# Patient Record
Sex: FEMALE | Race: WHITE | NOT HISPANIC OR LATINO | Employment: FULL TIME
[De-identification: names, ages, dates, MRNs, and addresses within clinical notes are randomized per-mention and may not be internally consistent; named-entity substitution may affect disease eponyms.]

---

## 2023-11-05 ENCOUNTER — HEALTH MAINTENANCE LETTER (OUTPATIENT)
Age: 53
End: 2023-11-05

## 2023-11-13 ENCOUNTER — IMMUNIZATION (OUTPATIENT)
Dept: INTERNAL MEDICINE | Facility: CLINIC | Age: 53
End: 2023-11-13
Payer: COMMERCIAL

## 2023-11-13 DIAGNOSIS — Z23 NEED FOR PROPHYLACTIC VACCINATION AND INOCULATION AGAINST INFLUENZA: Primary | ICD-10-CM

## 2023-11-13 DIAGNOSIS — Z23 NEED FOR COVID-19 VACCINE: ICD-10-CM

## 2023-11-13 PROCEDURE — 90686 IIV4 VACC NO PRSV 0.5 ML IM: CPT

## 2023-11-13 PROCEDURE — 90471 IMMUNIZATION ADMIN: CPT

## 2023-11-13 PROCEDURE — 99207 PR NO CHARGE NURSE ONLY: CPT

## 2023-11-13 PROCEDURE — 91320 SARSCV2 VAC 30MCG TRS-SUC IM: CPT

## 2023-11-13 PROCEDURE — 90480 ADMN SARSCOV2 VAC 1/ONLY CMP: CPT

## 2024-01-05 ENCOUNTER — OFFICE VISIT (OUTPATIENT)
Dept: INTERNAL MEDICINE | Facility: CLINIC | Age: 54
End: 2024-01-05
Payer: COMMERCIAL

## 2024-01-05 VITALS
DIASTOLIC BLOOD PRESSURE: 82 MMHG | WEIGHT: 137.8 LBS | TEMPERATURE: 98.7 F | HEART RATE: 66 BPM | OXYGEN SATURATION: 100 % | SYSTOLIC BLOOD PRESSURE: 134 MMHG | HEIGHT: 67 IN | BODY MASS INDEX: 21.63 KG/M2

## 2024-01-05 DIAGNOSIS — Z11.4 SCREENING FOR HIV (HUMAN IMMUNODEFICIENCY VIRUS): ICD-10-CM

## 2024-01-05 DIAGNOSIS — Z87.898 HISTORY OF LUMP OF RIGHT BREAST: ICD-10-CM

## 2024-01-05 DIAGNOSIS — Z12.4 CERVICAL CANCER SCREENING: ICD-10-CM

## 2024-01-05 DIAGNOSIS — Z86.32 HISTORY OF GESTATIONAL DIABETES: ICD-10-CM

## 2024-01-05 DIAGNOSIS — Z00.00 ROUTINE GENERAL MEDICAL EXAMINATION AT A HEALTH CARE FACILITY: ICD-10-CM

## 2024-01-05 DIAGNOSIS — Z00.00 ENCOUNTER FOR PREVENTATIVE ADULT HEALTH CARE EXAMINATION: ICD-10-CM

## 2024-01-05 DIAGNOSIS — Z12.11 SCREEN FOR COLON CANCER: Primary | ICD-10-CM

## 2024-01-05 DIAGNOSIS — Z11.59 NEED FOR HEPATITIS C SCREENING TEST: ICD-10-CM

## 2024-01-05 DIAGNOSIS — Z13.220 SCREENING FOR LIPID DISORDERS: ICD-10-CM

## 2024-01-05 DIAGNOSIS — Z12.31 VISIT FOR SCREENING MAMMOGRAM: ICD-10-CM

## 2024-01-05 PROBLEM — O24.419 GESTATIONAL DIABETES MELLITUS: Status: ACTIVE | Noted: 2024-01-05

## 2024-01-05 PROBLEM — O24.419 GESTATIONAL DIABETES MELLITUS: Status: RESOLVED | Noted: 2024-01-05 | Resolved: 2024-01-05

## 2024-01-05 PROCEDURE — 99386 PREV VISIT NEW AGE 40-64: CPT | Performed by: INTERNAL MEDICINE

## 2024-01-05 ASSESSMENT — ENCOUNTER SYMPTOMS
HEMATURIA: 0
HEMATOCHEZIA: 0
ABDOMINAL PAIN: 0
COUGH: 0
CHILLS: 0
CONSTIPATION: 0

## 2024-01-05 NOTE — PROGRESS NOTES
SUBJECTIVE:   Sobeida is a 53 year old, presenting for the following:  Physical  Breast ultrasound-referral   Colonoscopy       Healthy Habits:     Getting at least 3 servings of Calcium per day:  Yes    Bi-annual eye exam:  Yes    Dental care twice a year:  Yes    Sleep apnea or symptoms of sleep apnea:  None    Diet:  Regular (no restrictions)    Frequency of exercise:  2-3 days/week    Duration of exercise:  30-45 minutes    Taking medications regularly:  Yes    Medication side effects:  None    Additional concerns today:  No      Today's PHQ-2 Score:       2024     2:08 PM   PHQ-2 (  [employer])   Q1: Little interest or pleasure in doing things 0   Q2: Feeling down, depressed or hopeless 0   PHQ-2 Score 0   Q1: Little interest or pleasure in doing things Not at all   Q2: Feeling down, depressed or hopeless Not at all   PHQ-2 Score 0       Here for a physical    Denies any problems.    Had COVID in September.    History of L5-S1 discectomy long time ago.  Has chronic intermittent back pain, managed with OTC medications.    Had a breast lump on the right side which was biopsied in 2023.  Needs a follow-up ultrasound and MRI.    Pap smear was done in  at [facility], patient pulled up the report on her MyChart.  I am not able to see the report on my Care Everywhere.    She also had a colonoscopy in 2021 with repeat due in 3 years.        Social History     Tobacco Use    Smoking status: Never    Smokeless tobacco: Never   Substance Use Topics    Alcohol use: Not on file             2024     2:08 PM   Alcohol Use   Prescreen: >3 drinks/day or >7 drinks/week? No     Reviewed orders with patient.  Reviewed health maintenance and updated orders accordingly - Yes  Lab work is in process    Breast Cancer Screenin/5/2024     2:08 PM   Breast CA Risk Assessment (FHS-7)   Do you have a family history of breast, colon, or ovarian cancer? No / Unknown         Mammogram  "Screening: Recommended annual mammography  Pertinent mammograms are reviewed under the imaging tab.    History of abnormal Pap smear: NO - age 30-65 PAP every 5 years with negative HPV co-testing recommended     Reviewed and updated as needed this visit by clinical staff   Tobacco  Allergies  Meds              Reviewed and updated as needed this visit by Provider                     Review of Systems   Constitutional:  Negative for chills.   HENT:  Negative for congestion.    Respiratory:  Negative for cough.    Cardiovascular:  Negative for chest pain.   Gastrointestinal:  Negative for abdominal pain, constipation and hematochezia.   Genitourinary:  Negative for hematuria.          OBJECTIVE:   /82   Pulse 66   Temp 98.7  F (37.1  C) (Temporal)   Ht 1.702 m (5' 7\")   Wt 62.5 kg (137 lb 12.8 oz)   SpO2 100%   BMI 21.58 kg/m    Physical Exam  GENERAL: healthy, alert and no distress  EYES: Eyes grossly normal to inspection, PERRL and conjunctivae and sclerae normal  HENT: ear canals and TM's normal, nose and mouth without ulcers or lesions  NECK: no adenopathy, no asymmetry, masses, or scars and thyroid normal to palpation  RESP: lungs clear to auscultation - no rales, rhonchi or wheezes  CV: regular rate and rhythm, normal S1 S2, no S3 or S4, no murmur, click or rub, no peripheral edema and peripheral pulses strong  ABDOMEN: soft, nontender, no hepatosplenomegaly, no masses and bowel sounds normal  MS: no gross musculoskeletal defects noted, no edema  SKIN: no suspicious lesions or rashes  NEURO: Normal strength and tone, mentation intact and speech normal  PSYCH: mentation appears normal, affect normal/bright    Diagnostic Test Results:  Labs reviewed in Epic    ASSESSMENT/PLAN:   Sobeida was seen today for physical.    Diagnoses and all orders for this visit:        Routine general medical examination at a health care facility  Screen for colon cancer  -     Colonoscopy referral placed, to be done in " October of this year    Screening for HIV (human immunodeficiency virus)  -     Ordered    Need for hepatitis C screening test  -     Ordered    Cervical cancer screening  Due for repeat Pap in October 2025    Visit for screening mammogram  History of lump of right breast  Breast ultrasound and MRI ordered    History of gestational diabetes    Encounter for preventative adult health care examination  -    Labs ordered    Screening for lipid disorders        Other orders  -     REVIEW OF HEALTH MAINTENANCE PROTOCOL ORDERS  -     PRIMARY CARE FOLLOW-UP SCHEDULING; Future  -     PRIMARY CARE FOLLOW-UP SCHEDULING; Future        Patient has been advised of split billing requirements and indicates understanding: Yes      COUNSELING:  Reviewed preventive health counseling, as reflected in patient instructions        She reports that she has never smoked. She has never used smokeless tobacco.          Socrates Mendoza MD  [facility]

## 2024-01-05 NOTE — PATIENT INSTRUCTIONS
Please do fasting labs at your convenience.    Schedule Colonoscopy  for October 10/2024.      Preventive Health Recommendations  Female Ages 50 - 64    Yearly exam: See your health care provider every year in order to  Review health changes.   Discuss preventive care.    Review your medicines if your doctor has prescribed any.    Get a Pap test every three years (unless you have an abnormal result and your provider advises testing more often).  If you get Pap tests with HPV test, you only need to test every 5 years, unless you have an abnormal result.   You do not need a Pap test if your uterus was removed (hysterectomy) and you have not had cancer.  You should be tested each year for STDs (sexually transmitted diseases) if you're at risk.   Have a mammogram every 1 to 2 years.  Have a colonoscopy at age 45, or have a yearly FIT test (stool test). These exams screen for colon cancer.    Have a cholesterol test every 5 years, or more often if advised.  Have a diabetes test (fasting glucose) every three years. If you are at risk for diabetes, you should have this test more often.   If you are at risk for osteoporosis (brittle bone disease), think about having a bone density scan (DEXA).    Shots: Get a flu shot each year. Get a tetanus shot every 10 years.    Nutrition:   Eat at least 5 servings of fruits and vegetables each day.  Eat whole-grain bread, whole-wheat pasta and brown rice instead of white grains and rice.  Get adequate Calcium and Vitamin D.     Lifestyle  Exercise at least 150 minutes a week (30 minutes a day, 5 days a week). This will help you control your weight and prevent disease.  Limit alcohol to one drink per day.  No smoking.   Wear sunscreen to prevent skin cancer.   See your dentist every six months for an exam and cleaning.  See your eye doctor every 1 to 2 years.

## 2024-01-15 ENCOUNTER — LAB (OUTPATIENT)
Dept: LAB | Facility: CLINIC | Age: 54
End: 2024-01-15
Payer: COMMERCIAL

## 2024-01-15 DIAGNOSIS — Z11.4 SCREENING FOR HIV (HUMAN IMMUNODEFICIENCY VIRUS): ICD-10-CM

## 2024-01-15 DIAGNOSIS — Z13.220 SCREENING FOR LIPID DISORDERS: ICD-10-CM

## 2024-01-15 DIAGNOSIS — Z00.00 ENCOUNTER FOR PREVENTATIVE ADULT HEALTH CARE EXAMINATION: ICD-10-CM

## 2024-01-15 DIAGNOSIS — Z11.59 NEED FOR HEPATITIS C SCREENING TEST: ICD-10-CM

## 2024-01-15 LAB
ALBUMIN SERPL BCG-MCNC: 4.3 G/DL (ref 3.5–5.2)
ALP SERPL-CCNC: 37 U/L (ref 40–150)
ALT SERPL W P-5'-P-CCNC: 17 U/L (ref 0–50)
ANION GAP SERPL CALCULATED.3IONS-SCNC: 9 MMOL/L (ref 7–15)
AST SERPL W P-5'-P-CCNC: 26 U/L (ref 0–45)
BILIRUB SERPL-MCNC: 0.7 MG/DL
BUN SERPL-MCNC: 13.4 MG/DL (ref 6–20)
CALCIUM SERPL-MCNC: 9.1 MG/DL (ref 8.6–10)
CHLORIDE SERPL-SCNC: 105 MMOL/L (ref 98–107)
CHOLEST SERPL-MCNC: 161 MG/DL
CREAT SERPL-MCNC: 0.98 MG/DL (ref 0.51–0.95)
DEPRECATED HCO3 PLAS-SCNC: 27 MMOL/L (ref 22–29)
EGFRCR SERPLBLD CKD-EPI 2021: 69 ML/MIN/1.73M2
ERYTHROCYTE [DISTWIDTH] IN BLOOD BY AUTOMATED COUNT: 11.7 % (ref 10–15)
FASTING STATUS PATIENT QL REPORTED: YES
GLUCOSE SERPL-MCNC: 86 MG/DL (ref 70–99)
HBA1C MFR BLD: 5.1 % (ref 0–5.6)
HCT VFR BLD AUTO: 40.7 % (ref 35–47)
HCV AB SERPL QL IA: NONREACTIVE
HDLC SERPL-MCNC: 71 MG/DL
HGB BLD-MCNC: 14.1 G/DL (ref 11.7–15.7)
HIV 1+2 AB+HIV1 P24 AG SERPL QL IA: NONREACTIVE
LDLC SERPL CALC-MCNC: 78 MG/DL
MCH RBC QN AUTO: 30.9 PG (ref 26.5–33)
MCHC RBC AUTO-ENTMCNC: 34.6 G/DL (ref 31.5–36.5)
MCV RBC AUTO: 89 FL (ref 78–100)
NONHDLC SERPL-MCNC: 90 MG/DL
PLATELET # BLD AUTO: 211 10E3/UL (ref 150–450)
POTASSIUM SERPL-SCNC: 3.9 MMOL/L (ref 3.4–5.3)
PROT SERPL-MCNC: 7.4 G/DL (ref 6.4–8.3)
RBC # BLD AUTO: 4.57 10E6/UL (ref 3.8–5.2)
SODIUM SERPL-SCNC: 141 MMOL/L (ref 135–145)
TRIGL SERPL-MCNC: 58 MG/DL
TSH SERPL DL<=0.005 MIU/L-ACNC: 1.77 UIU/ML (ref 0.3–4.2)
WBC # BLD AUTO: 5.8 10E3/UL (ref 4–11)

## 2024-01-15 PROCEDURE — 84443 ASSAY THYROID STIM HORMONE: CPT

## 2024-01-15 PROCEDURE — 83036 HEMOGLOBIN GLYCOSYLATED A1C: CPT

## 2024-01-15 PROCEDURE — 36415 COLL VENOUS BLD VENIPUNCTURE: CPT

## 2024-01-15 PROCEDURE — 87389 HIV-1 AG W/HIV-1&-2 AB AG IA: CPT

## 2024-01-15 PROCEDURE — 80061 LIPID PANEL: CPT

## 2024-01-15 PROCEDURE — 80053 COMPREHEN METABOLIC PANEL: CPT

## 2024-01-15 PROCEDURE — 86803 HEPATITIS C AB TEST: CPT

## 2024-01-15 PROCEDURE — 85027 COMPLETE CBC AUTOMATED: CPT

## 2024-01-24 ENCOUNTER — TELEPHONE (OUTPATIENT)
Dept: GASTROENTEROLOGY | Facility: CLINIC | Age: 54
End: 2024-01-24
Payer: COMMERCIAL

## 2024-01-24 NOTE — TELEPHONE ENCOUNTER
"Endoscopy Scheduling Screen    Have you had a positive Covid test in the last 14 days?  No      Are you active on MyChart?   Yes      What insurance is in the chart?  Other:  [employer]    Ordering/Referring Provider: stefano   (If ordering provider performs procedure, schedule with ordering provider unless otherwise instructed. )    BMI: Estimated body mass index is 21.58 kg/m  as calculated from the following:    Height as of 1/5/24: 1.702 m (5' 7\").    Weight as of 1/5/24: 62.5 kg (137 lb 12.8 oz).     Sedation Ordered  moderate sedation.   If patient BMI > 50 do not schedule in [facility].    If patient BMI > 45 do not schedule at [facility].    Are you taking methadone or Suboxone?  No      Are you taking any prescription medications for pain 3 or more times per week?   NO - No RN review required.      Do you have a history of malignant hyperthermia or adverse reaction to anesthesia?  No      (Females) Are you currently pregnant?          Have you been diagnosed or told you have pulmonary hypertension?   No      Do you have an LVAD?  No      Have you been told you have moderate to severe sleep apnea?  No      Have you been told you have COPD, asthma, or any other lung disease?  No      Do you have any heart conditions?  No       Have you ever had an organ transplant?   No      Have you ever had or are you awaiting a heart or lung transplant?   No      Have you had a stroke or transient ischemic attack (TIA aka \"mini stroke\" in the last 6 months?   No      Have you been diagnosed with or been told you have cirrhosis of the liver?   No      Are you currently on dialysis?   No      Do you need assistance transferring?   No    BMI: Estimated body mass index is 21.58 kg/m  as calculated from the following:    Height as of 1/5/24: 1.702 m (5' 7\").    Weight as of 1/5/24: 62.5 kg (137 lb 12.8 oz).     Is patients BMI > 40 and scheduling location [facility]?  No      Do you take an injectable medication for weight loss or diabetes (excluding " insulin)?  No      Do you take the medication Naltrexone?  No      Do you take blood thinners?  No       Prep   Are you currently on dialysis or do you have chronic kidney disease?  No      Do you have a diagnosis of diabetes?  No      Do you have a diagnosis of cystic fibrosis (CF)?  No      On a regular basis do you go 3 -5 days between bowel movements?  No      BMI > 40?  No    Preferred Pharmacy:    [id number] - [place], [place] - [address] AT [facility] [address]  [address]  [facility] [address]  Phone: [phone] Fax: [phone]      Final Scheduling Details   Colonoscopy prep sent?  Standard MiraLAX    Procedure scheduled  Colonoscopy    Surgeon:  NAZANIN     Date of procedure:  5/13/24     Pre-OP / PAC:   No - Not required for this site.    Location  [facility] - Patient preference. [facility]    Sedation   Moderate Sedation - Per order.      Patient Reminders:   You will receive a call from a Nurse to review instructions and health history.  This assessment must be completed prior to your procedure.  Failure to complete the Nurse assessment may result in the procedure being cancelled.      On the day of your procedure, please designate an adult(s) who can drive you home stay with you for the next 24 hours. The medicines used in the exam will make you sleepy. You will not be able to drive.      You cannot take public transportation, ride share services, or non-medical taxi service without a responsible caregiver.  Medical transport services are allowed with the requirement that a responsible caregiver will receive you at your destination.  We require that drivers and caregivers are confirmed prior to your procedure.

## 2024-01-26 ENCOUNTER — MYC MEDICAL ADVICE (OUTPATIENT)
Dept: INTERNAL MEDICINE | Facility: CLINIC | Age: 54
End: 2024-01-26
Payer: COMMERCIAL

## 2024-02-01 ENCOUNTER — ANCILLARY PROCEDURE (OUTPATIENT)
Dept: MRI IMAGING | Facility: CLINIC | Age: 54
End: 2024-02-01
Attending: INTERNAL MEDICINE
Payer: COMMERCIAL

## 2024-02-01 ENCOUNTER — ANCILLARY PROCEDURE (OUTPATIENT)
Dept: ULTRASOUND IMAGING | Facility: CLINIC | Age: 54
End: 2024-02-01
Attending: INTERNAL MEDICINE
Payer: COMMERCIAL

## 2024-02-01 DIAGNOSIS — Z87.898 HISTORY OF LUMP OF RIGHT BREAST: ICD-10-CM

## 2024-02-01 PROCEDURE — A9585 GADOBUTROL INJECTION: HCPCS

## 2024-02-01 PROCEDURE — 77049 MRI BREAST C-+ W/CAD BI: CPT | Mod: GC

## 2024-02-01 PROCEDURE — 76642 ULTRASOUND BREAST LIMITED: CPT | Mod: RT

## 2024-02-01 RX ORDER — GADOBUTROL 604.72 MG/ML
7.5 INJECTION INTRAVENOUS ONCE
Status: COMPLETED | OUTPATIENT
Start: 2024-02-01 | End: 2024-02-01

## 2024-02-01 RX ADMIN — GADOBUTROL 6.5 ML: 604.72 INJECTION INTRAVENOUS at 08:51

## 2024-02-09 ENCOUNTER — MYC MEDICAL ADVICE (OUTPATIENT)
Dept: INTERNAL MEDICINE | Facility: CLINIC | Age: 54
End: 2024-02-09
Payer: COMMERCIAL

## 2024-02-09 DIAGNOSIS — Z87.898 HISTORY OF LUMP OF RIGHT BREAST: Primary | ICD-10-CM

## 2024-02-11 ENCOUNTER — PATIENT OUTREACH (OUTPATIENT)
Dept: ONCOLOGY | Facility: CLINIC | Age: 54
End: 2024-02-11
Payer: COMMERCIAL

## 2024-02-11 NOTE — PROGRESS NOTES
New Patient Oncology Nurse Navigator Note     Referring provider: Socrates Mendoza MD     Referring Clinic/Organization: [facility]     Referred to (specialty:) Breast Provider Referral     Date Referral Received: February 9, 2024     Evaluation for:  Z87.898 (ICD-10-CM) - History of lump of right breast     Clinical History (per Nurse review of records provided):      She had a left breast lump that was ultimately found to be a cyst. However, when she was having a work up with mammogram and ultrasound for the left breast lump, they noticed a concern on the right breast. 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 Negative for atypia and malignancy. Clinical correlation with imaging results is recommended.     6/22/23 - [facility] Accession Number: [id number]  Final Diagnosis:   Breast, right mass, ultrasound-guided needle core biopsy - Consistent with papilloma with   sclerosis, apocrine metaplasia and usual ductal hyperplasia (UDH). Negative for atypia and   malignancy.  Clinical correlation with imaging results is recommended.     Her most recent visit with Dr. Santiago Holden was on 7/12/2023. Discussed treatment options for papilloma including leaving it alone or removing papilloma along with imaging that would be associated with each option. Patient was not interested in removing papilloma at that time. Dr. Holden suggested screening with:  - MRI breast and ultrasound in 6 months   - Mammogram in 1 year  - Return to clinic in 6 months, sooner if she notices changes     Family history of aunt with breast and ovarian cancer.  She recalls having genetic counseling 15 years ago although Dr. Holden's note in 2023 states 10 years ago. She did have genetic testing ([employer]) and was negative for BRCA 1 or 2  mutations.     Patient saw Dr. Mendoza on 1/5/24. Provider placed imaging orders    2/1/24 - Bilateral breast MRI  FINDINGS:   In the right breast at approximately 10:00-9:00 position, posterior depth there is a 1.2 x 0.8 cm enhancing mass, presumably biopsy proven papilloma (axial image 79). No other suspicious enhancement in the breasts. Bilateral T2 hyperintense cysts in the breasts. No lymphadenopathy.  Pectus deformity of the chest. Partially visualized T2 hyperintense cyst in the liver.   IMPRESSION: BI-RADS CATEGORY: 2 - Benign.  RECOMMENDED FOLLOW-UP:   1. Continued surgical/clinical consultation for biopsy-proven right-sided papilloma. Examination can be compared with outside priors become available.  2. Continued age and risk based screening.    Prior imaging subsequently were received and radiologist reviewed with following addendum for 2/1/24 breast MRI:  Addendum: MRI was compared with outside prior mammogram and ultrasound  examinations from 6/7/2023 and 6/22/2023.  The initial impression and recommendations stand.    2/1/24 - Right breast ultrasound   Findings:     Targeted right breast ultrasound evaluation was performed by the technologist and radiologist. At the 9:00 position, 4 cm from the nipple, there is a 0.7 x 0.6 x 1.4 cm hypoechoic intraductal mass with a biopsy clip (previously 1.5 x 0.6 x 0.6 cm on 6/7/2023 on report from Care Everywhere, however images not available for direct comparison).  IMPRESSION: BI-RADS CATEGORY: 0 - Incomplete - Need Additional Imaging  Evaluation and/or Prior Mammograms for Comparison.  RECOMMENDED FOLLOW-UP: Obtain Prior Exam for Comparison.    NN PLEASE SEE [employer]BoxT MESSAGES BETWEEN PATIENT AND ORDERING PROVIDER ON 2/9/24:    2/12 11:09 - Telephoned and spoke with Amparo to assure her we are aware of the referral from her provider and are pulling records relevant to the referral and will be back in touch as soon as possible with an update. She expressed  appreciation for the call.     2/14 9:56 - Telephoned and spoke with Amparo informing her Zera notes received as well as prior imaging. I'll be back in touch when plan is finalized.     2/16 14:17 - Telephoned and left voice message for Amparo inviting call to New Patient Scheduling to finalize appointment with Marissa Gordillo PA-C for high-risk surveillance. The pectus deformity of the chest noted on breast MRI is likely something she was born with and presents no concerns. Encouraged her to also discuss that with Marissa for any further questions. Welcomed calls. Writer received referral, reviewed for appropriate plan, and referral transferred to New Patient Scheduling for completion.    2/19 11:23 - Amparo is now scheduled to see Marissa Gordillo PA-C on 3/21. Amparo asked  for clarification and writer call back to assist. We review the radiologist's comments after prior imaging was compared with recent MRI and ultrasound. We also reviewed what to expect at consult with Marissa Gordillo and her role in our breast and cancer risk management program. Patient verbalized satisfaction with these answers.    Records Location: See Bookmarked material     Records Needed: Breast imaging for past 5 years  [facility]era consult and progress notes  Genetic counseling consult and progress notes from approximately 2008 or 2013 (15 years ago or 10 years ago--needs clarification )  Genetic testing report from approximately 2008 or 2013

## 2024-02-12 ENCOUNTER — PRE VISIT (OUTPATIENT)
Dept: ONCOLOGY | Facility: CLINIC | Age: 54
End: 2024-02-12
Payer: COMMERCIAL

## 2024-02-12 NOTE — TELEPHONE ENCOUNTER
Imaging Received  February 13, 2024 8:16 AM ABT   Action: Breast Images from [facility] received and email sent to  Imaging team to resolve breast images to PACS.     Action February 12, 2024 11:41 AM ABT   Action Taken IB from SHALA Soriano - pt gave verbal auth to update [facility]     Records needed:  Breast imaging for past 5 years -- 2023 critical to move forward  Please collect authorization for [facility] notes needed    RECORDS STATUS - BREAST    RECORDS REQUESTED FROM: [facility]   DATE REQUESTED:    NOTES DETAILS STATUS   OFFICE NOTE from referring provider Epic 01/05/24: Dr. Socrates Mendoza   OFFICE NOTE from medical oncologist [facility] 07/12/23: Dr. Santiago Holden   MEDICATION LIST [facility]    LABS     PATHOLOGY REPORTS  (Tissue diagnosis, Stage, ER/AZ percentage positive and intensity of staining, HER2 IHC, FISH, and all biopsies from breast and any distant metastasis)                 Report in [facility] 06/22/23: [id number]   IMAGING (NEED IMAGES & REPORT)     MAMMO PACS [facility]:  06/22/23-07/27/20   ULTRASOUND PACS [facility]:  06/22/23-07/31/20: US Breast

## 2024-03-19 NOTE — PROGRESS NOTES
NEW CONSULTATION   Mar 21, 2024     Sobeida Diaz is a 53 year old woman who presents with a family history of breast cancer. She was referred by Dr. Mendoza.    HPI:    History of palpable left breast mass in 2023 that demonstrated a benign appearing cyst on work up. Work up demonstrated a right breast nodule at 9:00. Biopsy 6/22/23 demonstrated a papilloma. She had a breast MRI and right breast ultrasound 2/1/24 that demonstrated a right breast enhancing mass, stable intraductal mass. She did meet with Dr. Holden who recommended high risk screening.     Family history of aunt with breast and family history of ovarian cancer. Amparo had negative genetic testing prior to 2014.     Today she denies any breast mass, skin change, nipple inversion or nipple discharge.     BREAST-SPECIFIC HISTORY:    Previous breast imaging: Yes  - 11/21/12 Smammo BI-RADS 1  - 12/31/13 Smammo BI-RADS 1  - 6/3/15 Smammo BI-RADS 1  - 5/24/16 b/l Dmammo and left breast ultrasound for lump: benign cysts 7:00, 2:00 and 10:30 BI-RADS 2  - 6/5/17 Smammo BI-RADS 1  - 12/14/18 Smammo BI-RADS 1  - 7/27/20 Smammo left architectural distortion  - 7/31/20 left Dmammo and left breast ultrasound: BI-RADS 2  - 10/22/21  - 6/7/23 b/l Dmammo b/lbreast 9:00 6 mm lobulated density on mammo, right breast ultrasound 9:00 4 cm FN 1.5 cm nodule, left breast ultrasound 9:00 7 cm FN cluster of small cysts and 2:00 2.8 cm simples cyst. BI-RADS 4 biopsy recommend of right breast nodule.   - 6/22/23 right breast ultrasound guided biopsy of right breast nodule 9:00: papilloma   - 2/1/24 breast MRI and right breast ultrasound; MRI demonstrated 9:00 right breast 1.2 cm enhancing mass, ultrasound demonstrated 9:00 4 cm FN 1.4 cm intraductal mass, stable BI-RADS 2    Prior breast biopsies/surgeries: Yes  - 6/22/23 right breast ultrasound guided biopsy of right breast nodule 9:00: papilloma     Prior history of breast cancer or DCIS: No  Prior radiation  "history: No   Self breast exams: No  Breast density: extremely     GYN HISTORY:  Age at 1st pregnancy: 34. Breastfeeding history: No.   Age at menarche: 14  Menopausal: premenopausal  Menopausal hormone replacement therapy: No     RISK ASSESSMENT: < 3% 5 year risk by Tiff, > 20% lifetime risk by NO  Tiff:1.6% 5 year risk   NO/Claudia: 24.7% lifetime risk    FAMILY HISTORY:  Breast ca: Yes  - maternal aunt past of breast cancer in 70's (2 total aunts)  Ovarian ca: Yes  - mother's aunts ovarian cancer  - paternal mother sisters  Pancreatic ca: No  Prostate: No  Gastric ca: No  Melanoma: No  Colon ca: No  Other cancer: yes  - mother with lung cancer  Other genetic, testing, syndromes, or clotting disorders: no     PAST MEDICAL HISTORY  Past Medical History:   Diagnosis Date    Gestational diabetes mellitus      PAST SURGICAL HISTORY   Past Surgical History:   Procedure Laterality Date    BACK SURGERY  1994    L5 S1 herniated disc, had discectomy    CHOLECYSTECTOMY  1998     MEDICATIONS  No current outpatient medications on file.     ALLERGIES   No Known Allergies     SOCIAL HISTORY:  Smokes: No  EtOH: Yes, minimally   Exercise: active   Works for [employer]    ROS:  Change in vision No  Headaches: no  Respiratory: No shortness of breath, dyspnea on exertion, cough, or hemoptysis   Cardiovascular: negative   Gastrointestinal: negative Abdominal pain: no  Breast: negative  Musculoskeletal: negative Joint pain No Back pain: no  Psychiatric: negative  Hematologic/Lymphatic/Immunologic: negative  Endocrine: negative    EXAM  BP (!) 143/90   Pulse 63   Temp 97.8  F (36.6  C) (Oral)   Resp 20   Ht 1.737 m (5' 8.39\")   Wt 65.3 kg (144 lb)   SpO2 99%   BMI 21.65 kg/m     PHYSICAL EXAM  Respiratory: breathing non labored.   Breasts: Examination was done in both the upright and supine positions.  Breasts are symmetrical . No dominant fixed or suspicious masses noted. No skin or nipple changes. No nipple " discharge.   No clavicular, cervical, or axillary lymphadenopathy.       ASSESSMENT/PLAN:    Sobeida Diaz is a 53 year old woman personal history of right breast papilloma and family history of breast cancer. She meets NCCN guidelines for high risk screening.     1) Family history of breast cancer  Discussed she meets NCCN guidelines for high risk screening based on family history with lifetime risk for breast cancer of >20%. Screening recommendations based on NCCN guidelines. Be familiar with your breast and promptly report any changes to your health care provider. Clinical encounter every 6-12 months. Annual mammogram with carol alternating with annual breast MRI.    - Screening mammogram with clinic visit due: 8/2024  - Breast MRI with clinic visit due: 2/2/25  - Follow with genetic counseling.      2) Counseling was provided with available strategies including lifestyle modifications and risk reducing intervention.   - Maintain your best healthy weight. Higher body fat and adult weight gain is associated with increased risk for breast cancer. This increase in risk has been attributed to increase in circulating endogenous estrogen levels from fat tissue.   - Any alcohol intake increases the risk for breast cancer. If you choose to drink alcohol limit alcohol consumption to less than 1 drink (1 ounce of liquor, 6 ounces of wine, or 8 ounces of beer) per day or less than 3 drinks per week.  - Be active daily and void being sedentary.   - Vitamin D may decrease the risk of developing breast cancer.     Marissa Gordillo PA-C    30 minutes spent on the date of the encounter doing chart review, review of test results, interpretation of tests, patient visit and documentation.

## 2024-03-21 ENCOUNTER — PATIENT OUTREACH (OUTPATIENT)
Dept: ONCOLOGY | Facility: CLINIC | Age: 54
End: 2024-03-21

## 2024-03-21 ENCOUNTER — ONCOLOGY VISIT (OUTPATIENT)
Dept: SURGERY | Facility: CLINIC | Age: 54
End: 2024-03-21
Attending: INTERNAL MEDICINE
Payer: COMMERCIAL

## 2024-03-21 VITALS
DIASTOLIC BLOOD PRESSURE: 90 MMHG | BODY MASS INDEX: 21.82 KG/M2 | OXYGEN SATURATION: 99 % | WEIGHT: 144 LBS | SYSTOLIC BLOOD PRESSURE: 143 MMHG | RESPIRATION RATE: 20 BRPM | HEART RATE: 63 BPM | TEMPERATURE: 97.8 F | HEIGHT: 68 IN

## 2024-03-21 DIAGNOSIS — Z87.898 HISTORY OF LUMP OF RIGHT BREAST: ICD-10-CM

## 2024-03-21 DIAGNOSIS — Z80.3 FAMILY HISTORY OF BREAST CANCER: Primary | ICD-10-CM

## 2024-03-21 DIAGNOSIS — Z80.41 FAMILY HISTORY OF OVARIAN CANCER: ICD-10-CM

## 2024-03-21 PROCEDURE — 99213 OFFICE O/P EST LOW 20 MIN: CPT | Performed by: PHYSICIAN ASSISTANT

## 2024-03-21 ASSESSMENT — PAIN SCALES - GENERAL: PAINLEVEL: NO PAIN (0)

## 2024-03-21 NOTE — NURSING NOTE
"Oncology Rooming Note    March 21, 2024 7:53 AM   Sobeida Diaz is a 53 year old female who presents for:    Chief Complaint   Patient presents with    Oncology Clinic Visit     Hx of right breast lump     Initial Vitals: BP (!) 143/90   Pulse 63   Temp 97.8  F (36.6  C) (Oral)   Resp 20   Ht 1.737 m (5' 8.39\")   Wt 65.3 kg (144 lb)   SpO2 99%   BMI 21.65 kg/m   Estimated body mass index is 21.65 kg/m  as calculated from the following:    Height as of this encounter: 1.737 m (5' 8.39\").    Weight as of this encounter: 65.3 kg (144 lb). Body surface area is 1.78 meters squared.  No Pain (0) Comment: Data Unavailable   No LMP recorded. Patient is perimenopausal.  Allergies reviewed: Yes  Medications reviewed: Yes    Medications: Medication refills not needed today.  Pharmacy name entered into [employer]: [id number] - [facility] [address] AT [facility] OF [facility] [address]    Frailty Screening:   Is the patient here for a new oncology consult visit in cancer care? 2. No      Clinical concerns: none       Barbara Rosales CMA            "

## 2024-03-21 NOTE — PROGRESS NOTES
Writer received referral to Cancer Risk Management/Genetic Counseling.    Referred for:     family history of breast & ovarian cancer   Per referring note of Marissa: Family history of aunt with breast and family history of ovarian cancer. Amparo had negative genetic testing prior to 2014.     Follow with genetic counseling.    Referral reviewed for appropriate plan, and sent to New Patient Scheduling ([phone]) for completion.    Fide Beltre RN, BSN  Oncology New Patient Nurse Navigator   [facility] [place]  [phone]

## 2024-03-21 NOTE — LETTER
3/21/2024         RE: Sobeida Diaz  [address] [facility] [address]        Dear Colleague,    Thank you for referring your patient, Sobeida Diaz, to the [facility]. Please see a copy of my visit note below.    NEW CONSULTATION   Mar 21, 2024     Sobeida Diaz is a 53 year old woman who presents with a family history of breast cancer. She was referred by Dr. Mendoza.    HPI:    History of palpable left breast mass in 2023 that demonstrated a benign appearing cyst on work up. Work up demonstrated a right breast nodule at 9:00. Biopsy 6/22/23 demonstrated a papilloma. She had a breast MRI and right breast ultrasound 2/1/24 that demonstrated a right breast enhancing mass, stable intraductal mass. She did meet with Dr. Holden who recommended high risk screening.     Family history of aunt with breast and family history of ovarian cancer. Amparo had negative genetic testing prior to 2014.     Today she denies any breast mass, skin change, nipple inversion or nipple discharge.     BREAST-SPECIFIC HISTORY:    Previous breast imaging: Yes  - 11/21/12 Smammo BI-RADS 1  - 12/31/13 Smammo BI-RADS 1  - 6/3/15 Smammo BI-RADS 1  - 5/24/16 b/l Dmammo and left breast ultrasound for lump: benign cysts 7:00, 2:00 and 10:30 BI-RADS 2  - 6/5/17 Smammo BI-RADS 1  - 12/14/18 Smammo BI-RADS 1  - 7/27/20 Smammo left architectural distortion  - 7/31/20 left Dmammo and left breast ultrasound: BI-RADS 2  - 10/22/21  - 6/7/23 b/l Dmammo b/lbreast 9:00 6 mm lobulated density on mammo, right breast ultrasound 9:00 4 cm FN 1.5 cm nodule, left breast ultrasound 9:00 7 cm FN cluster of small cysts and 2:00 2.8 cm simples cyst. BI-RADS 4 biopsy recommend of right breast nodule.   - 6/22/23 right breast ultrasound guided biopsy of right breast nodule 9:00: papilloma   - 2/1/24 breast MRI and right breast ultrasound; MRI demonstrated 9:00 right breast 1.2 cm enhancing  "mass, ultrasound demonstrated 9:00 4 cm FN 1.4 cm intraductal mass, stable BI-RADS 2    Prior breast biopsies/surgeries: Yes  - 6/22/23 right breast ultrasound guided biopsy of right breast nodule 9:00: papilloma     Prior history of breast cancer or DCIS: No  Prior radiation history: No   Self breast exams: No  Breast density: extremely     GYN HISTORY:  Age at 1st pregnancy: 34. Breastfeeding history: No.   Age at menarche: 14  Menopausal: premenopausal  Menopausal hormone replacement therapy: No     RISK ASSESSMENT: < 3% 5 year risk by Tiff, > 20% lifetime risk by NO  Tiff:1.6% 5 year risk   NO/Claudia: 24.7% lifetime risk    FAMILY HISTORY:  Breast ca: Yes  - maternal aunt past of breast cancer in 70's (2 total aunts)  Ovarian ca: Yes  - mother's aunts ovarian cancer  - paternal mother sisters  Pancreatic ca: No  Prostate: No  Gastric ca: No  Melanoma: No  Colon ca: No  Other cancer: yes  - mother with lung cancer  Other genetic, testing, syndromes, or clotting disorders: no     PAST MEDICAL HISTORY  Past Medical History:   Diagnosis Date    Gestational diabetes mellitus      PAST SURGICAL HISTORY   Past Surgical History:   Procedure Laterality Date    BACK SURGERY  1994    L5 S1 herniated disc, had discectomy    CHOLECYSTECTOMY  1998     MEDICATIONS  No current outpatient medications on file.     ALLERGIES   No Known Allergies     SOCIAL HISTORY:  Smokes: No  EtOH: Yes, minimally   Exercise: active   Works for [employer]    ROS:  Change in vision No  Headaches: no  Respiratory: No shortness of breath, dyspnea on exertion, cough, or hemoptysis   Cardiovascular: negative   Gastrointestinal: negative Abdominal pain: no  Breast: negative  Musculoskeletal: negative Joint pain No Back pain: no  Psychiatric: negative  Hematologic/Lymphatic/Immunologic: negative  Endocrine: negative    EXAM  BP (!) 143/90   Pulse 63   Temp 97.8  F (36.6  C) (Oral)   Resp 20   Ht 1.737 m (5' 8.39\")   Wt 65.3 kg (144 lb)  "  SpO2 99%   BMI 21.65 kg/m     PHYSICAL EXAM  Respiratory: breathing non labored.   Breasts: Examination was done in both the upright and supine positions.  Breasts are symmetrical . No dominant fixed or suspicious masses noted. No skin or nipple changes. No nipple discharge.   No clavicular, cervical, or axillary lymphadenopathy.       ASSESSMENT/PLAN:    Sobeida Diaz is a 53 year old woman personal history of right breast papilloma and family history of breast cancer. She meets NCCN guidelines for high risk screening.     1) Family history of breast cancer  Discussed she meets NCCN guidelines for high risk screening based on family history with lifetime risk for breast cancer of >20%. Screening recommendations based on NCCN guidelines. Be familiar with your breast and promptly report any changes to your health care provider. Clinical encounter every 6-12 months. Annual mammogram with carol alternating with annual breast MRI.    - Screening mammogram with clinic visit due: 8/2024  - Breast MRI with clinic visit due: 2/2/25  - Follow with genetic counseling.      2) Counseling was provided with available strategies including lifestyle modifications and risk reducing intervention.   - Maintain your best healthy weight. Higher body fat and adult weight gain is associated with increased risk for breast cancer. This increase in risk has been attributed to increase in circulating endogenous estrogen levels from fat tissue.   - Any alcohol intake increases the risk for breast cancer. If you choose to drink alcohol limit alcohol consumption to less than 1 drink (1 ounce of liquor, 6 ounces of wine, or 8 ounces of beer) per day or less than 3 drinks per week.  - Be active daily and void being sedentary.   - Vitamin D may decrease the risk of developing breast cancer.     Marissa Gordillo PA-C    30 minutes spent on the date of the encounter doing chart review, review of test results, interpretation of tests,  patient visit and documentation.

## 2024-03-21 NOTE — PATIENT INSTRUCTIONS
Sobeida Diaz is a 53 year old woman personal history of right breast papilloma and family history of breast cancer. She meets NCCN guidelines for high risk screening.     1) Family history of breast cancer  Discussed she meets NCCN guidelines for high risk screening based on family history with lifetime risk for breast cancer of >20%. Screening recommendations based on NCCN guidelines. Be familiar with your breast and promptly report any changes to your health care provider. Clinical encounter every 6-12 months. Annual mammogram with carol alternating with annual breast MRI.    - Screening mammogram with clinic visit due: 8/2024  - Breast MRI with clinic visit due: 2/2/25  - Follow with genetic counseling.      2) Counseling was provided with available strategies including lifestyle modifications and risk reducing intervention.   - Maintain your best healthy weight. Higher body fat and adult weight gain is associated with increased risk for breast cancer. This increase in risk has been attributed to increase in circulating endogenous estrogen levels from fat tissue.   - Any alcohol intake increases the risk for breast cancer. If you choose to drink alcohol limit alcohol consumption to less than 1 drink (1 ounce of liquor, 6 ounces of wine, or 8 ounces of beer) per day or less than 3 drinks per week.  - Be active daily and void being sedentary.   - Vitamin D may decrease the risk of developing breast cancer.

## 2024-04-29 DIAGNOSIS — Z80.3 FAMILY HISTORY OF BREAST CANCER: Primary | ICD-10-CM

## 2024-05-01 ENCOUNTER — TELEPHONE (OUTPATIENT)
Dept: GASTROENTEROLOGY | Facility: CLINIC | Age: 54
End: 2024-05-01
Payer: COMMERCIAL

## 2024-05-01 NOTE — TELEPHONE ENCOUNTER
"Per 10/6/21 colonoscopy report with [facility] ([facility]): \"Repeat colonoscopy in 5 years for surveillance\".  Seeking clarification from PCP/referring provider if a screening colonoscopy is indicated at this time.   Proceed with colonoscopy procedure as scheduled on 5/13/24 per referring provider, Dr. Mendoza: \"I thought the report said repeat 3 years and that is what the patient told me.\" and \"Yes  , proceed with it as scheduled.\"    Patient could check with insurance to confirm coverage of procedure.   ---------------------------------------------------------    Pre visit planning completed.      Procedure details:    Patient scheduled for Colonoscopy  on 5/13/24.     Arrival time: 0715. Procedure time 0800    Facility location: [facility]; [address] [place] [id number]. Check in location: [address] [place] [address].     Sedation type: Conscious sedation     Pre op exam needed? N/A    Indication for procedure: Screening      Chart review:     Electronic implanted devices? No    Recent diagnosis of diverticulitis within the last 6 weeks? No    Diabetic? No      Medication review:    Anticoagulants? No    NSAIDS? No NSAID medications per patient's medication list.  RN will verify with pre-assessment call.    Other medication HOLDING recommendations:  N/A      Prep for procedure:     Bowel prep recommendation: Standard Miralax  Due to: standard bowel prep.    Prep instructions sent via [employer]    Addendum in red. Anette Wei RN on 5/2/2024 at 4:09 PM    Anette Wei RN  Endoscopy Procedure Pre Assessment RN  [phone] option 4      "

## 2024-05-03 NOTE — TELEPHONE ENCOUNTER
Attempted to contact patient in order to complete pre assessment questions.     No answer. Left message to return call to [phone] option 4    Callback required communication sent via [employer].      Anette Wei RN  Endoscopy Procedure Pre Assessment RN

## 2024-05-03 NOTE — TELEPHONE ENCOUNTER
Pre assessment completed for upcoming procedure.   (Please see previous telephone encounter notes for complete details)    Patient  returned call.       Procedure details:    Arrival time and facility location reviewed.    Pre op exam needed? N/A    Designated  policy reviewed. Instructed to have someone stay 6  hours post procedure.       Medication review:    Medications reviewed. Please see supporting documentation below. Holding recommendations discussed (if applicable).   NSAID medication(s): Ibuprofen (Advil, Motrin): HOLD 1 day before procedure.      Prep for procedure:     Procedure prep instructions reviewed.        Any additional information needed:  N/A      Patient  verbalized understanding and had no questions or concerns at this time.      Guillermina Corona RN  Endoscopy Procedure Pre Assessment RN  [phone] option 4

## 2024-05-13 ENCOUNTER — HOSPITAL ENCOUNTER (OUTPATIENT)
Facility: CLINIC | Age: 54
Discharge: HOME | End: 2024-05-13
Attending: COLON & RECTAL SURGERY | Admitting: COLON & RECTAL SURGERY
Payer: COMMERCIAL

## 2024-05-13 VITALS
BODY MASS INDEX: 21.22 KG/M2 | HEART RATE: 61 BPM | OXYGEN SATURATION: 97 % | DIASTOLIC BLOOD PRESSURE: 65 MMHG | SYSTOLIC BLOOD PRESSURE: 98 MMHG | HEIGHT: 68 IN | RESPIRATION RATE: 14 BRPM | WEIGHT: 140 LBS

## 2024-05-13 LAB — COLONOSCOPY: NORMAL

## 2024-05-13 PROCEDURE — 250N000011 HC RX IP 250 OP 636: Performed by: COLON & RECTAL SURGERY

## 2024-05-13 PROCEDURE — 45378 DIAGNOSTIC COLONOSCOPY: CPT | Performed by: COLON & RECTAL SURGERY

## 2024-05-13 PROCEDURE — G0500 MOD SEDAT ENDO SERVICE >5YRS: HCPCS | Performed by: COLON & RECTAL SURGERY

## 2024-05-13 PROCEDURE — G0121 COLON CA SCRN NOT HI RSK IND: HCPCS | Performed by: COLON & RECTAL SURGERY

## 2024-05-13 RX ORDER — ACETAMINOPHEN 325 MG/1
325-650 TABLET ORAL EVERY 6 HOURS PRN
COMMUNITY
End: 2024-08-07

## 2024-05-13 RX ORDER — LIDOCAINE 40 MG/G
CREAM TOPICAL
Status: DISCONTINUED | OUTPATIENT
Start: 2024-05-13 | End: 2024-05-13 | Stop reason: HOSPADM

## 2024-05-13 RX ORDER — FENTANYL CITRATE 50 UG/ML
INJECTION, SOLUTION INTRAMUSCULAR; INTRAVENOUS PRN
Status: DISCONTINUED | OUTPATIENT
Start: 2024-05-13 | End: 2024-05-13 | Stop reason: HOSPADM

## 2024-05-13 RX ORDER — ONDANSETRON 2 MG/ML
4 INJECTION INTRAMUSCULAR; INTRAVENOUS
Status: DISCONTINUED | OUTPATIENT
Start: 2024-05-13 | End: 2024-05-13 | Stop reason: HOSPADM

## 2024-05-13 RX ORDER — IBUPROFEN 200 MG
200 TABLET ORAL EVERY 4 HOURS PRN
COMMUNITY
End: 2024-08-07

## 2024-05-13 ASSESSMENT — ACTIVITIES OF DAILY LIVING (ADL)
ADLS_ACUITY_SCORE: 35
ADLS_ACUITY_SCORE: 35

## 2024-05-13 NOTE — H&P
Colon & Rectal Surgery History and Physical  Pre-Endoscopy Procedure Note    History of Present Illness   I have been asked by Dr. Mendoza to evaluate this 53 year old female for colorectal polyp surveillance. She currently denies any abdominal pain, weight loss, bleeding per rectum, or recent change in bowel habits.    Past Medical History  Diagnosis Date    Gestational diabetes mellitus     History of colonic polyps        Past Surgical History  Procedure Laterality Date    BACK SURGERY      L5 S1 herniated disc, had discectomy     SECTION      x1    CHOLECYSTECTOMY       BREAST CLIP PLACEMENT RIGHT      WISDOM TOOTH EXTRACTION          Medications  Medications Prior to Admission   Medication Sig    acetaminophen (TYLENOL) 325 MG tablet Take 325-650 mg by mouth every 6 hours as needed for mild pain    ibuprofen (ADVIL/MOTRIN) 200 MG tablet Take 200 mg by mouth every 4 hours as needed for pain       Allergies   No Known Allergies     Family History   Family history is not contributory    Social History   She reports that she has never smoked. She has never used smokeless tobacco. She reports current alcohol use. She reports that she does not use drugs.    Review of Systems   Constitutional:  No fever, weight change or fatigue.    Eyes:     No dry eyes or vision changes.   Ears/Nose/Throat/Neck:  No oral ulcers, sore throat or voice change.    Cardiovascular:   No palpitations, syncope, angina or edema.   Respiratory:    No chest pain, excessive sleepiness, shortness of breath or hemoptysis.    Gastrointestinal:   No abdominal pain, nausea, vomiting, diarrhea or heartburn.    Genitourinary:   No dysuria, hematuria, urinary retention or urinary frequency.   Musculoskeletal:  No joint swelling or arthralgias.    Dermatologic:  No skin rash or other skin changes.   Neurologic:    No focal weakness or numbness. No neuropathy.   Psychiatric:    No depression, anxiety, suicidal ideation, or paranoid  "ideation.   Endocrine:   No cold or heat intolerance, polydipsia, hirsutism, change in libido, or flushing.   Hematology/Lymphatic:  No bleeding or lymphadenopathy.    Allergy/Immunology:  No rhinitis or hives.     Physical Exam   Vitals:  Blood pressure 138/97, pulse 68, resp. rate 9, height 1.727 m (5' 8\"), weight 63.5 kg (140 lb), SpO2 99%.    General:  Alert and oriented to person, place and time   Airway: Normal oropharyngeal airway and neck mobility   Lungs:  Clear bilaterally   Heart:  Regular rate and rhythm   Abdomen: Soft, NT, ND, no masses   Extremities: Warm, good capillary refill    ASA Grade: II (mild systemic disease)    Impression: Cleared for use of conscious sedation for colorectal polyp surveillance    Plan: Proceed with colonoscopy     Dyana Castro MD  [facility]  [phone]  "

## 2024-07-26 ENCOUNTER — VIRTUAL VISIT (OUTPATIENT)
Dept: ONCOLOGY | Facility: CLINIC | Age: 54
End: 2024-07-26
Attending: PHYSICIAN ASSISTANT
Payer: COMMERCIAL

## 2024-07-26 DIAGNOSIS — Z80.3 FAMILY HISTORY OF MALIGNANT NEOPLASM OF BREAST: Primary | ICD-10-CM

## 2024-07-26 DIAGNOSIS — Z80.41 FAMILY HISTORY OF MALIGNANT NEOPLASM OF OVARY: ICD-10-CM

## 2024-07-26 PROCEDURE — 96040 HC GENETIC COUNSELING, EACH 30 MINUTES: CPT | Mod: GT,95 | Performed by: GENETIC COUNSELOR, MS

## 2024-07-26 NOTE — NURSING NOTE
Current patient location: [address] [place] [address]    Is the patient currently in the state of [place]? YES    Visit mode:VIDEO    If the visit is dropped, the patient can be reconnected by: VIDEO VISIT: Text to cell phone:   Telephone Information:   Mobile [phone]       Will anyone else be joining the visit? NO  (If patient encounters technical issues they should call [phone])    How would you like to obtain your AVS? MyChart    Are changes needed to the allergy or medication list? N/A    Are refills needed on medications prescribed by this physician? NO    Reason for visit: Consult    MARINA HILL

## 2024-07-26 NOTE — Clinical Note
"7/26/2024      Sobeida Diaz  [address] [facility] [address]      Dear Colleague,    Thank you for referring your patient, Sobeida Diaz, to the [facility]. Please see a copy of my visit note below.    7/26/2024    Virtual Visit Details    Type of service:  Video Visit     Originating Location (pt. Location): {video visit patient location:038142::\"Home\"}  {PROVIDER LOCATION On-site should be selected for visits conducted from your clinic location or adjoining [facility] hospital, academic office, or other nearby [facility] building. Off-site should be selected for all other provider locations, including home:[address]}  Distant Location (provider location):  {virtual location provider:635046}  Platform used for Video Visit: {Virtual Visit Platforms:065675::\"sambaash\"}    Referring Provider: Marissa Gordillo PA-C    Presenting Information:   I met with Sobeida for her video genetic counseling visit, through the Cancer Risk Management Program, to discuss her family history of breast and ovarian cancer. Today we reviewed this history, cancer screening recommendations, and available genetic testing options.    Personal History:  Sobeida is a 53 year old year old female. She does not have any personal history of cancer. She had her first menstrual period at age 14, her first child at age 34, and is premenopausal. Sobeida has her ovaries, fallopian tubes and uterus in place, and she has had no ovarian cancer screening to date. She reports that she has not used hormone replacement therapy.      She has regular clinical breast exams and mammograms; her most recent mammogram in June 2023 found a hypoechoic nodule that was identified as a papilloma. Sobeida began having colonoscopies at the age of 53. Her most recent colonoscopy in May 2024 was normal and follow-up was recommended in 10 years. She does not regularly do any other cancer screening at this time.    Family History: " (Please see scanned pedigree for detailed family history information)  Sobeida's mother was diagnosed with lung cancer at age 79. ***She is reported to have a history of smoking.  A maternal aunt was diagnosed with breast cancer ***.  ***  Her maternal ethnicity is [place]ian. Her paternal ethnicity is Pal[place]an, Pashto, and K[place]z. There is no known Ashkenazi Alevism ancestry on either side of her family. There is no reported consanguinity.    Discussion:  Sobeida's family history of *** is suggestive of a hereditary cancer syndrome.  We reviewed the features of sporadic, familial, and hereditary cancers. We discussed that mutations in either BRCA1 or BRCA2 could be possible hereditary explanations for her family history of cancer. Mutations in the BRCA1 or BRCA2 gene are known to cause Hereditary Breast and Ovarian Cancer Syndrome (HBOC). HBOC typically presents with multiple family members diagnosed with breast cancer before age 50 and/or ovarian cancer. Other cancer risks associated with HBOC include male breast cancer, prostate cancer, pancreatic cancer, and melanoma.   We discussed the natural history and genetics of hereditary cancer. A detailed handout regarding hereditary cancer, along with the other information we discussed, will be mailed to Sobeida at the end of our appointment today and can be found in the after visit summary. Topics included: inheritance pattern, cancer risks, cancer screening recommendations, and also risks, benefits and limitations of testing.  Based on her personal and family history, Sobeida does NOT meet current National Comprehensive Cancer Network (NCCN) criteria for genetic testing.  We discussed that there are additional genes that could cause increased risk for *** cancer. As many of these genes present with overlapping features in a family and accurate cancer risk cannot always be established based upon the pedigree analysis alone, it would be reasonable for Sobeida to consider panel  genetic testing to analyze multiple genes at once.  ***  ***Sobeida stated that she would prefer to submit a saliva kit for her genetic testing. Lis will send a kit directly to her home with directions on how to collect a saliva sample. We discussed that there is a small chance for sample failure due to contamination of the sample. To help minimize this, she should follow the directions that are sent with the kit. Sobeida verbalized understanding of this. Once the sample is collected, she will send it to [employer] using the return envelope and prepaid shipping label.   Verbal consent was given over video and written on the consent form. Turnaround time is approximately 4 weeks once the lab receives the sample.  ***  The possible outcomes of positive, negative, and uncertain were discussed with Sobeida. A detailed handout that explains this in detail was provided to the patient.  Medical Management: For Sobeida, we reviewed that the information from genetic testing may determine:  additional cancer screening for which Sobeida may qualify (i.e. mammogram and breast MRI, more frequent colonoscopies, more frequent dermatologic exams, etc.),  options for risk reducing surgeries Sobeida could consider (i.e. bilateral mastectomy, surgery to remove her ovaries and/or uterus, etc.),    and targeted chemotherapies if she were to develop certain cancers in the future (i.e. immunotherapy for individuals with Lieberman syndrome, PARP inhibitors, etc.).   These recommendations and possible targeted chemotherapies will be discussed in detail once genetic testing is completed.     Plan:  1) Today Sobeida elected to proceed with *** testing with automatic reflex to ***.  2) A copy of the consent form and the after visit summary will be sent to Sobeida.  3) This information should be available in approximately 4 weeks, once the lab receives the sample.  4) I will call Sobeida with the results once they become available.    Time spent  on video: *** minutes    Gianni Kelsey MS, [facility]  Licensed, Certified Genetic Counselor    ***      Again, thank you for allowing me to participate in the care of your patient.        Sincerely,        Gianni Kelsey, GC

## 2024-07-26 NOTE — PATIENT INSTRUCTIONS
Assessing Cancer Risk  Cancer is a common diagnosis which impacts many families.  Individuals may develop cancer due to environmental factors (such as exposures and lifestyle), aging, genetic predisposition, or a combination of these factors.      Only about 5-10% of cancers are thought to be due to an inherited cancer susceptibility gene.    These families often have:  Several people with the same or related types of cancer  Cancers diagnosed at a young age (before age 50)  Individuals with more than one primary cancer  Multiple generations of the family affected with cancer    Comprehensive Breast and Gynecologic Cancer Panel  We each inherit two copies of every gene in our bodies: one from our mother, and one from our father. Each gene has a specific job to do.  When a gene has a mistake or  mutation  in it, it does not work like it should.     Some people may be candidates for genetic testing of more than one gene.  For these families, genetic testing using a cancer panel may be offered. These panels will test different genes at once known to increase the risk for breast, ovarian, uterine, and/or other cancers.    This handout will review common hereditary breast and gynecologic cancer syndromes. The genes that will be discussed in this handout are: SRINIVASA, BRCA1, BRCA2, BRIP1, CDH1, CHEK2, MLH1, MSH2, MSH6, PMS2, EPCAM, PTEN, PALB2, RAD51C, RAD51D, and TP53.    The purpose of this handout is to serve as a brief summary of the breast and gynecologic cancer risk genes that have published clinical management guidelines for individuals who are found to carry a mutation. Inheriting a mutation does not mean a person will develop cancer, but it does significantly increase their risk above the general population risk.     ______________________________________________________________________________    Hereditary Breast and Ovarian Cancer Syndrome (BRCA1 and BRCA2)  A single mutation in one of the copies of BRCA1 or  BRCA2 increases the risk for breast and ovarian cancer, among others.  The risk for pancreatic cancer and melanoma may also be slightly increased in some families.  The chart below shows the chance that someone with a BRCA mutation would develop cancer in his or her lifetime1,2,3,4.       Lifetime Cancer Risks    General Population BRCA1  BRCA2   Breast  12% >60% >60%   Ovarian  1-2% 39-58% 13-29%   Prostate 12% 7-26% 19-61%   Male Breast 0.1% 0.2-1.2% 1.8-7.1%   Pancreas 1-2% Up to 5% 5-10%     A person s ethnic background is also important to consider, as individuals of Ashkenazi Quaker ancestry have a higher chance of having a BRCA gene mutation.  There are three BRCA mutations that occur more frequently in this population.      Lieberman Syndrome (MLH1, MSH2, MSH6, PMS2, and EPCAM)  Currently five genes are known to cause Lieberman Syndrome: MLH1, MSH2, MSH6, PMS2, and EPCAM.  A single mutation in one of the Lieberman Syndrome genes increases the risk for colon, endometrial, ovarian, and stomach cancers.  Other cancers that occur less commonly in Lieberman Syndrome include urinary tract, skin, and brain cancers.  The chart below shows the chance that a person with Lieberman syndrome would develop cancer in his or her lifetime5.      Lifetime Cancer Risks    General Population Lieberman Syndrome   Colon 5% 10-61%   Endometrial 3% 13-57%   Ovarian 1-2% 1-38%   Stomach <1% 1-9%   *Cancer risk varies depending on Lieberman syndrome gene found      Cowden Syndrome (PTEN)  Cowden syndrome is a hereditary condition that increases the risk for breast, thyroid, endometrial, colon, and kidney cancer.  Cowden syndrome is caused by a mutation in the PTEN gene.  A single mutation in one of the copies of PTEN causes Cowden syndrome and increases cancer risk.  The chart below shows the chance that someone with a PTEN mutation would develop cancer in their lifetime6,7.  Other benign features seen in some individuals with Cowden syndrome include benign  skin lesions (facial papules, keratoses, lipomas), learning disability, autism, thyroid nodules, colon polyps, and larger head size.     Lifetime Cancer Risks    General Population Cowden   Breast 12% 40-60%*   Thyroid 1% Up to 38%   Renal 1-2% Up to 35%   Endometrial 3% Up to 28%   Colon 5% Up to 9%   Melanoma 2-3% Up to 6%   *Emerging data suggests the risk for breast cancer could be greater than 60%               Li-Fraumeni Syndrome (TP53)  Li-Fraumeni Syndrome (LFS) is a cancer predisposition syndrome caused by a mutation in the TP53 gene. A single mutation in one of the copies of TP53 increases the risk for multiple cancers. Individuals with LFS are at an increased risk for developing cancer at a young age. The lifetime risk for development of a LFS-associated cancer is 50% by age 30 and 90% by age 60.   Core Cancers: Sarcomas, Breast, Brain, Lung, Leukemias/Lymphomas, Adrenocortical carcinomas  Other Cancers: Gastrointestinal, Thyroid, Skin, Genitourinary       Hereditary Diffuse Gastric Cancer (CDH1)  Currently, one gene is known to cause hereditary diffuse gastric cancer (HDGC): CDH1.  Individuals with HDGC are at increased risk for diffuse gastric cancer and lobular breast cancer. Of people diagnosed with HDGC, 30-50% have a mutation in the CDH1 gene.  This suggests there are likely other genes that may cause HDGC that have not been identified yet.      Lifetime Cancer Risks    General Population HDGC   Diffuse Gastric  <1% ~80%   Breast 12% 41-60%       Additional Genes    SRINIVASA  SRINIVASA is a moderate-risk breast cancer gene. Women who have a mutation in SRINIVASA can have between a 2-4 fold increased risk for breast cancer compared to the general population8. SRINIVASA mutations have also been associated with increased risk for pancreatic cancer between 5-10%9. Individuals who inherit two SRINIVASA mutations have a condition called ataxia-telangiectasia (AT).  This rare autosomal recessive condition affects the nervous system  and immune system, and is associated with progressive cerebellar ataxia beginning in childhood. Individuals with ataxia-telangiectasia often have a weakened immune system and have an increased risk for childhood cancers.    PALB2  Mutations in PALB2 have been shown to increase the risk of breast cancer up to 41-60% in some families; where individuals fall within this risk range is dependent upon family [id number]. PALB2 mutations have also been associated with increased risk for pancreatic cancer between 5-10%.  Individuals who inherit two PALB2 mutations--one from their mother and one from their father--have a condition called Fanconi Anemia.  This rare autosomal recessive condition is associated with short stature, developmental delay, bone marrow failure, and increased risk for childhood cancers.    CHEK2   CHEK2 is a moderate-risk breast cancer gene.  Women who have a mutation in CHEK2 have around a 2-4 fold increased risk for breast cancer compared to the general population, and this risk may be higher depending upon family history.11,12,13 The risk of colon cancer may be twice as high as the general population risk of colon cancer of 5%. Mutations in CHEK2 have also been shown to increase the risk of other cancers, including prostate, however these cancer risks are currently not well understood.    BRIP1, RAD51C and RAD51D  Mutations in RAD51C and RAD51D have been shown to increase the risk of ovarian cancer and breast cancer 14,. Mutations in BRIP1 have been shown to increase the risk of ovarian cancer and possibly female breast cancer 15 .       Lifetime Cancer Risk    General Population        BRIP1   RAD51C  RAD51D   Breast 12% Not well defined 20-40% 20-40%   Ovarian 1-2% 5-15% 10-15% 10-20%     ______________________________________________________________  Inheritance  All of the cancer syndromes reviewed above are inherited in an autosomal dominant pattern.  This means that if a parent has a mutation,  each of their children will have a 50% chance of inheriting that same mutation. Therefore, each child --male or female-- would have a 50% chance of being at increased risk for developing cancer.    Image obtained from Genetics Home Reference, 2013     Mutations in some genes can occur de tahmina, which means that a person s mutation occurred for the first time in them and was not inherited from a parent.  Now that they have the mutation, however, it can be passed on to future generations.    Genetic Testing  Genetic testing involves a blood test and will look for any harmful mutations that are associated with increased cancer risk.  If possible, it is recommended that the person(s) who has had cancer be tested before other family members.  That person will give us the most useful information about whether or not a specific gene is associated with the cancer in the family.    Results  There are three possible results of genetic testing:  Positive--a harmful mutation was identified in one or more of the genes  Negative--no mutations were identified in any of the genes tested  Variant of unknown significance--a variation in one of the genes was identified, but it is unclear how this impacts cancer risk in the family    Advantages and Disadvantages   There are advantages and disadvantages to genetic testing.    Advantages  May clarify your cancer risk  Can help you make medical decisions  May explain the cancers in your family  May give useful information to your family members (if you share your results)    Disadvantages  Possible negative emotional impact of learning about inherited cancer risk  Uncertainty in interpreting a negative test result in some situations  Possible genetic discrimination concerns (see below)    Genetic Information Nondiscrimination Act (AMALIA)  The Genetic Information Nondiscrimination Act of 2008 (AMALIA) is a federal law that protects individuals from health insurance or employment discrimination  based on a genetic test result alone (with some exceptions, including employers with fewer than 15 employees, and ).  Although rare, AMALIA  does not cover discrimination protections in terms of life insurance, long term care, or disability insurances.  Visit the National [employer] [place] website to learn more.    Reducing Cancer Risk  All of the genes described in this handout have nationally recognized cancer screening guidelines that would be recommended for individuals who test positive.  In addition to increased cancer screening, surgeries may be offered or recommended to reduce cancer risk.  Recommendations are based upon an individual s genetic test result as well as their personal and family history of cancer.    Questions to Think About Regarding Genetic Testing:  What effect will the test result have on me and my relationship with my family members if I have an inherited gene mutation?  If I don t have a gene mutation?  Should I share my test results, and how will my family react to this news, which may also affect them?  Are my children ready to learn new information that may one day affect their own health?    Hereditary Cancer Resources    FORCE: Facing Our Risk of Cancer Empowered facingourrisk.org   Bright Pink bebrightpink.org   Li-Fraumeni Syndrome Association lfsassociation.org   PTEN World PTENworld.com   No stomach for cancer, Inc. nostomachforcancer.org   Stomach cancer relief network Scrnet.org   Collaborative Group of the Americas on Inherited Colorectal Cancer (CGA) cgaicc.com    Cancer Care cancercare.org   American Cancer Society (ACS) cancer.org   National Cancer [place] (NCI) cancer.gov     Please call us if you have any questions or concerns.   Cancer Risk Management Program [phone]-CANCER ([phone])  Gianni Kelsey, MS [facility]  [phone]  Sera Kerr, MS, [facility] [phone]  Yasemin Buck, MS, [facility]  [phone]  Kate Lobato, MS, [facility]  [phone]  Amelia Griffin,  MS, [facility]  [phone]  Mis Abrams, MS, [facility] [phone]  Amber Simon, MS, [facility] [phone]    References  Connie Machuca PDP, Siobhan S, Fly BRENNAN, Jm JE, Trever JL, Alonzo N, Letty H, Jose O, Bakari A, Pasini B, Radialeyda P, Manleslye S, Bárbara DM, Kirk N, Jermaine E, Nicolás H, Villanueva E, Brooklyn J, Gronyoung J, Sally B, Tulinius H, Thorlacius S, Eerola H, Nevanlinna H, Vi K, Jeannine OP. Average risks of breast and ovarian cancer associated with BRCA1 or BRCA2 mutations detected in case series unselected for family history: a combined analysis of 222 studies. Am J Hum F[place]umo. 2003;72:1117-30.  Aminata N, Karla M, John G.  BRCA1 and BRCA2 Hereditary Breast and Ovarian Cancer. Gene Reviews online. 2013.  Jonatan YC, Liana S, Parisa G, Alba S. Breast cancer risk among male BRCA1 and BRCA2 mutation carriers. J Natl Cancer Inst. 2007;99:1811-4.  Blaise AHUMADA, Caroline I, Shamir J, Gaviota E, Maria Del Carmen ER, Felipe F. Risk of breast cancer in male BRCA2 carriers. J Med Fadumo. 2010;47:710-1.  National Comprehensive Cancer Network. Clinical practice guidelines in oncology, colorectal cancer screening. Available online (registration required). 2015.  Michael MH, Joon J, Harleen J, José Miguel GUERRERO, Eduardo MS, Eng C. Lifetime cancer risks in individuals with germline PTEN mutations. Clin Cancer Res. 2012;18:400-7.  Roopa R. Cowden Syndrome: A Critical Review of the Clinical Literature. J Fadumo . 2009:18:13-27.  Kade HYATT, Mohinder SILVA, Crystal S, Nani P, Tata T, Orin M, Patrick B, Lyndon H, Leonor R, Dany K, Sydney L, Blaise AHUMADA, Bárbara SILVA, Lucian DF, Nevaeh MR, The Breast Cancer Susceptibility Collaboration (UK) & Rudy AN. SRINIVASA mutations that cause ataxia-telangiectasia are breast cancer susceptibility alleles. Nature Genetics. 2006;38:873-875  Kayden N , Leesa Y, Sabina J, Rodrigo L, Rafat PENNINGTON , Tatiana ML, Neville S, Paola AG, Tawanda S, Sarai ML, Layo J , Beverly R, Caitlin STINSON, Hillary  JR, Papo VE, Carlotta M, Vobrookestein B, Nisreen N, Kareem RH, Jessica KW, and Cj AP. SRINIVASA mutations in patients with hereditary pancreatic cancer. Cancer Discover. 2012;2:41-46  Jerri ROSE., et al. Breast-Cancer Risk in Families with Mutations in PALB2. NEJM. 2014; 371(6):497-506.  CHEK2 Breast Cancer Case-Control Consortium. CHEK2*1100delC and susceptibility to breast cancer: A collaborative analysis involving 10,860 breast cancer cases and 9,065 controls from 10 studies. Am J Hum F[place]umo, 74 (2004), pp. 6504-1683  Jj T, Tim S, Kimber K, et al. Spectrum of Mutations in BRCA1, BRCA2, CHEK2, and TP53 in Families at High Risk of Breast Cancer. WENDY. 2006;295(12):0552-2507.   Alecia C, Jose D, Noris HYATT, et al. Risk of breast cancer in women with a CHEK2 mutation with and without a family history of breast cancer. J Clin Oncol. 2011;29:9446-2141.  Song H, Tomáss E, Ramus SJ, et al. Contribution of germline mutations in the RAD51B, RAD51C, and RAD51D genes to ovarian cancer in the population. J Clin Oncol. 2015;33(26):6303-3741. Doi:10.1200/JCO.2015.61.2408.  David T, Andrea DF, Ho P, et al. Mutations in BRIP1 confer high risk of ovarian cancer. Gaviota Fadumo. 2011;43(11):8063-9399. doi:10.1038/ng.955.

## 2024-07-26 NOTE — PROGRESS NOTES
2024    Referring Provider: Marissa Gordillo PA-C    Presenting Information:   I met with Sobeida for her video genetic counseling visit, through the Cancer Risk Management Program, to discuss her family history of breast and ovarian cancer. Today we reviewed this history, cancer screening recommendations, and available genetic testing options.    Personal History:  Sobeida is a 53 year old year old female. She does not have any personal history of cancer. She had her first menstrual period at age 14, her first child at age 34, and is perimenopausal. Sobeida has her ovaries, fallopian tubes and uterus in place, and she has had no ovarian cancer screening to date. She reports that she has not used hormone replacement therapy.      She has regular clinical breast exams and mammograms; her most recent mammogram in 2023 found a hypoechoic nodule that was identified as a papilloma. Sobeida began having colonoscopies at the age of 51. Her most recent colonoscopy in May 2024 was normal and follow-up was recommended in 10 years. She does not regularly do any other cancer screening at this time.    Family History: (Please see scanned pedigree for detailed family history information)  Sobeida's mother was diagnosed with lung cancer at age 79. She is reported to have a history of smoking.  A maternal aunt was diagnosed with breast cancer in her late 60's and  at 75.  Sobeida's father was diagnosed with non-melanoma skin cancer at an unknown age.  A paternal uncle was diagnosed with lymphoma in her 80's.  4 of Sobeida's paternal grandmother's sisters  from ovarian cancers at unknown ages.  One of their daughters, Sobeida's father's cousin,  from ovarian cancer at an unknown age.   Sobeida's paternal grandmother's parents both passed away from unknown cancers.  Her maternal ethnicity is [place] Islander. Her paternal ethnicity is [place], [place]ie, and Norwegian. There is no known Ashkenazi Gnosticism ancestry on either side of  her family. There is no reported consanguinity.    Discussion:  Sobeida's family history of breast and ovarian cancer is suggestive of a hereditary cancer syndrome.  We reviewed the features of sporadic, familial, and hereditary cancers. We discussed that mutations in either BRCA1 or BRCA2 could be possible hereditary explanations for her family history of cancer. Mutations in the BRCA1 or BRCA2 gene are known to cause Hereditary Breast and Ovarian Cancer Syndrome (HBOC). HBOC typically presents with multiple family members diagnosed with breast cancer before age 50 and/or ovarian cancer. Other cancer risks associated with HBOC include male breast cancer, prostate cancer, pancreatic cancer, and melanoma.   We discussed the natural history and genetics of hereditary cancer. A detailed handout regarding hereditary cancer, along with the other information we discussed, will be mailed to Sobeida at the end of our appointment today and can be found in the after visit summary. Topics included: inheritance pattern, cancer risks, cancer screening recommendations, and also risks, benefits and limitations of testing.  Based on her personal and family history, Sobeida does NOT meet current National Comprehensive Cancer Network (NCCN) criteria for genetic testing. However, we discussed that her father meets NCCN criteria for genetic testing and is unable to pursue genetic testing. As such, it would be appropriate for Sobeida to pursue genetic testing for BRCA1/2 along with other high-penetrance ovarian cancer susceptibility genes (including BRIP1, MLH1, MSH2, MSH6, PMS2, EPCAM, PALB2, RAD51C, and RAD51D).  We also discussed that her father only has one brother. This small, male dominated family does not allow us to provide a complete analysis of the risk for Sobeida for hereditary breast and/or ovarian cancer, as there are not many close female relatives. Plus, she has had 4 great aunt pass from ovarian cancer, so it is  reasonable for Sobeida to pursue genetic testing to better understand her risk for hereditary cancer.  We discussed that there are additional genes that could cause increased risk for breast and/or ovarian cancer. As many of these genes present with overlapping features in a family and accurate cancer risk cannot always be established based upon the pedigree analysis alone, it would be reasonable for Sobeida to consider panel genetic testing to analyze multiple genes at once.  Genetic testing is available for BRCA1/2 as part of the patient's core panel. This will then be automatically reflexed to [facility]'s Common Hereditary Cancers panel.  Genetic testing is available for 48 genes associated with hereditary cancer: Common Hereditary Cancers panel (APC, SRINIVASA, AXIN2, BAP1, BARD1, BMPR1A, BRCA1, BRCA2, BRIP1, CDH1, CDK4, CDKN2A, CHEK2, CTNNA1, DICER1, EPCAM, FH, GREM1, HOXB13, KIT, MBD4, MEN1, MLH1, MSH2, MSH3, MSH6, MUTYH, NF1, NTHL1, PALB2, PDGFRA, PMS2, POLD1, POLE, PTEN, RAD51C, RAD51D, SDHA, SDHB, SDHC, SDHD, SMAD4, SMARCA4, STK11, TP53, TSC1, TSC2, and VHL).  We discussed that many of the genes in the Common Hereditary Cancers panel are associated with specific hereditary cancer syndromes and published management guidelines: Hereditary Breast and Ovarian Cancer syndrome (BRCA1, BRCA2), Lieberman syndrome (MLH1, MSH2, MSH6, PMS2, EPCAM), Familial Adenomatous Polyposis (APC), Hereditary Diffuse Gastric Cancer (CDH1), Familial Atypical Multiple Mole Melanoma syndrome (CDK4, CDKN2A), Juvenile Polyposis syndrome (BMPR1A, SMAD4), Cowden syndrome (PTEN), Li Fraumeni syndrome (TP53), Peutz-Jeghers syndrome (STK11), MUTYH Associated Polyposis (MUTYH), Tuberous Sclerosis complex (TSC1, TSC2), Neurofibromatosis type 1 (NF1), Hereditary Leiomyomatosis and Renal Cell Carcinoma (FH), Multiple Endocrine Neoplasia type 1 (MEN1), Hereditary Paraganglioma and Pheochromocytoma (SDHA, SDHB, SDHC, SDHD), and von Hippel-Lindau (VHL).   The  SRINIVASA, AXIN2, BRIP1, CHEK2, GREM1, MSH3, NTHL1, PALB2, POLD1, POLE, RAD51C, and RAD51D genes are associated with increased cancer risk and have published management guidelines for certain cancers.    The remaining genes (BAP1, BARD1, CTNNA1, DICER1, HOXB13, KIT, MBD4, PDGFRA, and SMARCA4) are associated with increased cancer risk and may allow us to make medical recommendations when mutations are identified.    Sobeida would like to submit a blood sample for her genetic testing. She will go to her [facility] at her earliest convenience to get her blood drawn for her genetic testing.   Verbal consent was given over video and written on the consent form. Turnaround time is approximately 4 weeks once the lab receives the sample.  Should Sobeida have any questions regarding the billing for her genetic testing, she should visit [facility]'s billing website at [email or web address] or call them at [phone].  The possible outcomes of positive, negative, and uncertain were discussed with Sobeida. A detailed handout that explains this in detail was provided to the patient.  Medical Management: For Sobeida, we reviewed that the information from genetic testing may determine:  additional cancer screening for which Sobeida may qualify (i.e. mammogram and breast MRI, more frequent colonoscopies, more frequent dermatologic exams, etc.),  options for risk reducing surgeries Sobeida could consider (i.e. bilateral mastectomy, surgery to remove her ovaries and/or uterus, etc.),    and targeted chemotherapies if she were to develop certain cancers in the future (i.e. immunotherapy for individuals with Lieberman syndrome, PARP inhibitors, etc.).   These recommendations and possible targeted chemotherapies will be discussed in detail once genetic testing is completed.     Plan:  1) Today Sobeida elected to proceed with BRCA1/2 testing with automatic reflex to [facility] Common  Hereditary Cancers panel.  2) A copy of the consent form and the after visit summary will be sent to Sobeida.  3) This information should be available in approximately 4 weeks, once the lab receives the sample.  4) I will call Sobeida with the results once they become available.    Time spent on video: 46 minutes    Gianni Kelsey MS, [facility]  Licensed, Certified Genetic Counselor      Virtual Visit Details    Type of service:  Video Visit     Originating Location (pt. Location): Home  Distant Location (provider location):  Off-site  Platform used for Video Visit: Brianda

## 2024-07-26 NOTE — LETTER
2024    Sobeida Diaz  [address] [facility] [address]      Dear Sobeida,    It was a pleasure speaking with you over video on 2024. Here is a copy of the progress note from our discussion. If you have any additional questions, please feel free to call.    Referring Provider: Marissa Gordillo PA-C    Presenting Information:   I met with Sobeida for her video genetic counseling visit, through the Cancer Risk Management Program, to discuss her family history of breast and ovarian cancer. Today we reviewed this history, cancer screening recommendations, and available genetic testing options.    Personal History:  Sobeida is a 53 year old year old female. She does not have any personal history of cancer. She had her first menstrual period at age 14, her first child at age 34, and is perimenopausal. Sobeida has her ovaries, fallopian tubes and uterus in place, and she has had no ovarian cancer screening to date. She reports that she has not used hormone replacement therapy.      She has regular clinical breast exams and mammograms; her most recent mammogram in 2023 found a hypoechoic nodule that was identified as a papilloma. Sobeida began having colonoscopies at the age of 51. Her most recent colonoscopy in May 2024 was normal and follow-up was recommended in 10 years. She does not regularly do any other cancer screening at this time.    Family History: (Please see scanned pedigree for detailed family history information)  Sobeida's mother was diagnosed with lung cancer at age 79. She is reported to have a history of smoking.  A maternal aunt was diagnosed with breast cancer in her late 60's and  at 75.  Sobeida's father was diagnosed with non-melanoma skin cancer at an unknown age.  A paternal uncle was diagnosed with lymphoma in her 80's.  4 of Sobeida's paternal grandmother's sisters  from ovarian cancers at unknown ages.  One of their daughters, Sobeida's father's  cousin,  from ovarian cancer at an unknown age.   Sobeida's paternal grandmother's parents both passed away from unknown cancers.  Her maternal ethnicity is [place]. Her paternal ethnicity is Bel[place]ian, Burmese, and Urdu. There is no known Ashkenazi Catholic ancestry on either side of her family. There is no reported consanguinity.    Discussion:  Sobeida's family history of breast and ovarian cancer is suggestive of a hereditary cancer syndrome.  We reviewed the features of sporadic, familial, and hereditary cancers. We discussed that mutations in either BRCA1 or BRCA2 could be possible hereditary explanations for her family history of cancer. Mutations in the BRCA1 or BRCA2 gene are known to cause Hereditary Breast and Ovarian Cancer Syndrome (HBOC). HBOC typically presents with multiple family members diagnosed with breast cancer before age 50 and/or ovarian cancer. Other cancer risks associated with HBOC include male breast cancer, prostate cancer, pancreatic cancer, and melanoma.   We discussed the natural history and genetics of hereditary cancer. A detailed handout regarding hereditary cancer, along with the other information we discussed, will be mailed to Sobeida at the end of our appointment today and can be found in the after visit summary. Topics included: inheritance pattern, cancer risks, cancer screening recommendations, and also risks, benefits and limitations of testing.  Based on her personal and family history, Sobeida does NOT meet current National Comprehensive Cancer Network (NCCN) criteria for genetic testing. However, we discussed that her father meets NCCN criteria for genetic testing and is unable to pursue genetic testing. As such, it would be appropriate for Sobeida to pursue genetic testing for BRCA1/2 along with other high-penetrance ovarian cancer susceptibility genes (including BRIP1, MLH1, MSH2, MSH6, PMS2, EPCAM, PALB2, RAD51C, and RAD51D).  We also discussed that her father only has  one brother. This small, male dominated family does not allow us to provide a complete analysis of the risk for Sobeida for hereditary breast and/or ovarian cancer, as there are not many close female relatives. Plus, she has had 4 great aunt pass from ovarian cancer, so it is reasonable for Sobeida to pursue genetic testing to better under stand her risk for hereditary cancer.  We discussed that there are additional genes that could cause increased risk for breast and/or ovarian cancer. As many of these genes present with overlapping features in a family and accurate cancer risk cannot always be established based upon the pedigree analysis alone, it would be reasonable for Sobeida to consider panel genetic testing to analyze multiple genes at once.  Genetic testing is available for BRCA1/2 as part of the patient's core panel. This will then be automatically reflexed to [facility]'s Common Hereditary Cancers panel.  Genetic testing is available for 48 genes associated with hereditary cancer: Common Hereditary Cancers panel (APC, SRINIVASA, AXIN2, BAP1, BARD1, BMPR1A, BRCA1, BRCA2, BRIP1, CDH1, CDK4, CDKN2A, CHEK2, CTNNA1, DICER1, EPCAM, FH, GREM1, HOXB13, KIT, MBD4, MEN1, MLH1, MSH2, MSH3, MSH6, MUTYH, NF1, NTHL1, PALB2, PDGFRA, PMS2, POLD1, POLE, PTEN, RAD51C, RAD51D, SDHA, SDHB, SDHC, SDHD, SMAD4, SMARCA4, STK11, TP53, TSC1, TSC2, and VHL).  We discussed that many of the genes in the Common Hereditary Cancers panel are associated with specific hereditary cancer syndromes and published management guidelines: Hereditary Breast and Ovarian Cancer syndrome (BRCA1, BRCA2), Lieberman syndrome (MLH1, MSH2, MSH6, PMS2, EPCAM), Familial Adenomatous Polyposis (APC), Hereditary Diffuse Gastric Cancer (CDH1), Familial Atypical Multiple Mole Melanoma syndrome (CDK4, CDKN2A), Juvenile Polyposis syndrome (BMPR1A, SMAD4), Cowden syndrome (PTEN), Li Fraumeni syndrome (TP53), Peutz-Jeghers syndrome (STK11), MUTYH Associated Polyposis (MUTYH),  Tuberous Sclerosis complex (TSC1, TSC2), Neurofibromatosis type 1 (NF1), Hereditary Leiomyomatosis and Renal Cell Carcinoma (FH), Multiple Endocrine Neoplasia type 1 (MEN1), Hereditary Paraganglioma and Pheochromocytoma (SDHA, SDHB, SDHC, SDHD), and von Hippel-Lindau (VHL).   The SRINIVASA, AXIN2, BRIP1, CHEK2, GREM1, MSH3, NTHL1, PALB2, POLD1, POLE, RAD51C, and RAD51D genes are associated with increased cancer risk and have published management guidelines for certain cancers.    The remaining genes (BAP1, BARD1, CTNNA1, DICER1, HOXB13, KIT, MBD4, PDGFRA, and SMARCA4) are associated with increased cancer risk and may allow us to make medical recommendations when mutations are identified.    Sobeida would like to submit a blood sample for her genetic testing. She will go to her [facility] at her earliest convenience to get her blood drawn for her genetic testing.   Verbal consent was given over video and written on the consent form. Turnaround time is approximately 4 weeks once the lab receives the sample.  Should Sobeida have any questions regarding the billing for her genetic testing, she should visit [employer]'s billing website at [email or web address] or call them at [phone].  The possible outcomes of positive, negative, and uncertain were discussed with Sobeida. A detailed handout that explains this in detail was provided to the patient.  Medical Management: For Sobeida, we reviewed that the information from genetic testing may determine:  additional cancer screening for which Sobeida may qualify (i.e. mammogram and breast MRI, more frequent colonoscopies, more frequent dermatologic exams, etc.),  options for risk reducing surgeries Sobeida could consider (i.e. bilateral mastectomy, surgery to remove her ovaries and/or uterus, etc.),    and targeted chemotherapies if she were to develop certain cancers in the future (i.e. immunotherapy for individuals  with Lieberman syndrome, PARP inhibitors, etc.).   These recommendations and possible targeted chemotherapies will be discussed in detail once genetic testing is completed.     Plan:  1) Today Sobeida elected to proceed with BRCA1/2 testing with automatic reflex to [facility]'s Common Hereditary Cancers panel.  2) A copy of the consent form and the after visit summary will be sent to Sobeida.  3) This information should be available in approximately 4 weeks, once the lab receives the sample.  4) I will call Sobeida with the results once they become available.    Time spent on video: 46 minutes    Gianni Kelsey MS, [facility] [place] [facility]  Licensed, Certified Genetic Counselor

## 2024-08-02 ENCOUNTER — LAB (OUTPATIENT)
Dept: LAB | Facility: CLINIC | Age: 54
End: 2024-08-02
Payer: COMMERCIAL

## 2024-08-02 DIAGNOSIS — Z80.41 FAMILY HISTORY OF MALIGNANT NEOPLASM OF OVARY: ICD-10-CM

## 2024-08-02 DIAGNOSIS — Z80.3 FAMILY HISTORY OF MALIGNANT NEOPLASM OF BREAST: ICD-10-CM

## 2024-08-02 PROCEDURE — 99000 SPECIMEN HANDLING OFFICE-LAB: CPT

## 2024-08-02 PROCEDURE — 36415 COLL VENOUS BLD VENIPUNCTURE: CPT

## 2024-08-05 NOTE — PROGRESS NOTES
FOLLOW UP  Aug 7, 2024     Sobeida Diaz is a 53 year old woman who presents with a family history of breast cancer.     HPI:    History of palpable left breast mass in 2023 that demonstrated a benign appearing cyst on work up. Work up demonstrated a right breast nodule at 9:00. Biopsy 6/22/23 demonstrated a papilloma. She had a breast MRI and right breast ultrasound 2/1/24 that demonstrated a right breast enhancing mass, stable intraductal mass. She did meet with Dr. Holden who recommended high risk screening.     Family history of aunt with breast and family history of ovarian cancer. Amparo had negative genetic testing prior to 2014. She recently had genetic testing and there results are pending.     Today she denies any breast mass, skin change, nipple inversion or nipple discharge. She is due for a screening mammogram today.     BREAST-SPECIFIC HISTORY:    Previous breast imaging: Yes  - 11/21/12 Smammo BI-RADS 1  - 12/31/13 Smammo BI-RADS 1  - 6/3/15 Smammo BI-RADS 1  - 5/24/16 b/l Dmammo and left breast ultrasound for lump: benign cysts 7:00, 2:00 and 10:30 BI-RADS 2  - 6/5/17 Smammo BI-RADS 1  - 12/14/18 Smammo BI-RADS 1  - 7/27/20 Smammo left architectural distortion  - 7/31/20 left Dmammo and left breast ultrasound: BI-RADS 2  - 10/22/21  - 6/7/23 b/l Dmammo b/lbreast 9:00 6 mm lobulated density on mammo, right breast ultrasound 9:00 4 cm FN 1.5 cm nodule, left breast ultrasound 9:00 7 cm FN cluster of small cysts and 2:00 2.8 cm simples cyst. BI-RADS 4 biopsy recommend of right breast nodule.   - 6/22/23 right breast ultrasound guided biopsy of right breast nodule 9:00: papilloma   - 2/1/24 breast MRI and right breast ultrasound; MRI demonstrated 9:00 right breast 1.2 cm enhancing mass, ultrasound demonstrated 9:00 4 cm FN 1.4 cm intraductal mass, stable BI-RADS 2  - 8/7/24 Smammo BI-RADS 1    Prior breast biopsies/surgeries: Yes  - 6/22/23 right breast ultrasound guided biopsy of right breast  nodule 9:00: papilloma     Prior history of breast cancer or DCIS: No  Prior radiation history: No   Self breast exams: No  Breast density: extremely     GYN HISTORY:  Age at 1st pregnancy: 34. Breastfeeding history: No.   Age at menarche: 14  Menopausal: premenopausal  Menopausal hormone replacement therapy: No     RISK ASSESSMENT: < 3% 5 year risk by Tiff, > 20% lifetime risk by NO  Tiff: 1.4% 5 year risk   NO/Claudia: 24.5% lifetime risk    FAMILY HISTORY:  Breast ca: Yes  - maternal aunt past of breast cancer in 70's (2 total aunts)  Ovarian ca: Yes  - mother's aunts ovarian cancer  - paternal mother sisters  Pancreatic ca: No  Prostate: No  Gastric ca: No  Melanoma: No  Colon ca: No  Other cancer: yes  - mother with lung cancer  Other genetic, testing, syndromes, or clotting disorders: no     PAST MEDICAL HISTORY  Past Medical History:   Diagnosis Date    Gestational diabetes mellitus     History of colonic polyps      PAST SURGICAL HISTORY   Past Surgical History:   Procedure Laterality Date    BACK SURGERY      L5 S1 herniated disc, had discectomy     SECTION      x1    CHOLECYSTECTOMY      COLONOSCOPY      COLONOSCOPY N/A 2024    Procedure: Colonoscopy;  Surgeon: Dyana Castro MD;  Location:  GI    US BREAST CLIP PLACEMENT RIGHT      WISDOM TOOTH EXTRACTION       MEDICATIONS  Current Outpatient Medications   Medication Sig Dispense Refill    acetaminophen (TYLENOL) 325 MG tablet Take 325-650 mg by mouth every 6 hours as needed for mild pain      ibuprofen (ADVIL/MOTRIN) 200 MG tablet Take 200 mg by mouth every 4 hours as needed for pain       ALLERGIES   No Known Allergies     SOCIAL HISTORY:  Smokes: No  EtOH: Yes, minimally   Exercise: active   Works for [employer]    ROS:  Change in vision No  Headaches: no  Respiratory: No shortness of breath, dyspnea on exertion, cough, or hemoptysis   Cardiovascular: negative   Gastrointestinal: negative Abdominal pain:  no  Breast: negative  Musculoskeletal: negative Joint pain No Back pain: no  Psychiatric: negative  Hematologic/Lymphatic/Immunologic: negative  Endocrine: negative    EXAM  /82 (BP Location: Right arm, Patient Position: Right side, Cuff Size: Adult Regular)   Pulse 62   Temp 97.7  F (36.5  C) (Oral)   Resp 12   Wt 66.2 kg (146 lb)   SpO2 100%   BMI 22.20 kg/m     PHYSICAL EXAM  Respiratory: breathing non labored.   Breasts: Examination was done in both the upright and supine positions.  Breasts are symmetrical . No dominant fixed or suspicious masses noted. No skin or nipple changes. No nipple discharge.   No clavicular, cervical, or axillary lymphadenopathy.     INVESTIGATIONS:  8/7/24 screening mammogram: BI-RADS 1    ASSESSMENT/PLAN:    Sobeida Diaz is a 53 year old woman personal history of right breast papilloma and family history of breast cancer. She meets NCCN guidelines for high risk screening.     1) Family history of breast cancer  She meets NCCN guidelines for high risk screening based on family history with lifetime risk for breast cancer of >20%. Screening recommendations based on NCCN guidelines. Be familiar with your breast and promptly report any changes to your health care provider. Clinical encounter every 6-12 months. Annual mammogram with carol alternating with annual breast MRI.    - Breast MRI with clinic visit due: 2/2/25  - Screening mammogram with clinic visit due: 8/2024     2) Counseling was provided with available strategies including lifestyle modifications and risk reducing intervention.   - Maintain your best healthy weight. Higher body fat and adult weight gain is associated with increased risk for breast cancer. This increase in risk has been attributed to increase in circulating endogenous estrogen levels from fat tissue.   - Any alcohol intake increases the risk for breast cancer. If you choose to drink alcohol limit alcohol consumption to less than 1  drink (1 ounce of liquor, 6 ounces of wine, or 8 ounces of beer) per day or less than 3 drinks per week.  - Be active daily and void being sedentary.   - Vitamin D may decrease the risk of developing breast cancer.     Marissa Gordillo PA-C    20 minutes spent on the date of the encounter doing chart review, review of test results, interpretation of tests, patient visit and documentation.

## 2024-08-07 ENCOUNTER — ONCOLOGY VISIT (OUTPATIENT)
Dept: SURGERY | Facility: CLINIC | Age: 54
End: 2024-08-07
Attending: PHYSICIAN ASSISTANT
Payer: COMMERCIAL

## 2024-08-07 ENCOUNTER — ANCILLARY PROCEDURE (OUTPATIENT)
Dept: MAMMOGRAPHY | Facility: CLINIC | Age: 54
End: 2024-08-07
Attending: PHYSICIAN ASSISTANT
Payer: COMMERCIAL

## 2024-08-07 VITALS
DIASTOLIC BLOOD PRESSURE: 82 MMHG | HEART RATE: 62 BPM | OXYGEN SATURATION: 100 % | RESPIRATION RATE: 12 BRPM | SYSTOLIC BLOOD PRESSURE: 134 MMHG | BODY MASS INDEX: 22.2 KG/M2 | WEIGHT: 146 LBS | TEMPERATURE: 97.7 F

## 2024-08-07 DIAGNOSIS — Z80.3 FAMILY HISTORY OF BREAST CANCER: ICD-10-CM

## 2024-08-07 DIAGNOSIS — Z91.89 AT HIGH RISK FOR BREAST CANCER: Primary | ICD-10-CM

## 2024-08-07 PROCEDURE — 99213 OFFICE O/P EST LOW 20 MIN: CPT | Performed by: PHYSICIAN ASSISTANT

## 2024-08-07 PROCEDURE — 77067 SCR MAMMO BI INCL CAD: CPT | Performed by: STUDENT IN AN ORGANIZED HEALTH CARE EDUCATION/TRAINING PROGRAM

## 2024-08-07 PROCEDURE — 77063 BREAST TOMOSYNTHESIS BI: CPT | Performed by: STUDENT IN AN ORGANIZED HEALTH CARE EDUCATION/TRAINING PROGRAM

## 2024-08-07 ASSESSMENT — PAIN SCALES - GENERAL: PAINLEVEL: NO PAIN (0)

## 2024-08-07 NOTE — LETTER
8/7/2024      Sobeida Diaz  [address] [facility] [address]      Dear Colleague,    Thank you for referring your patient, Sobeida Diaz, to the [facility]. Please see a copy of my visit note below.    FOLLOW UP  Aug 7, 2024     Sobeida Diaz is a 53 year old woman who presents with a family history of breast cancer.     HPI:    History of palpable left breast mass in 2023 that demonstrated a benign appearing cyst on work up. Work up demonstrated a right breast nodule at 9:00. Biopsy 6/22/23 demonstrated a papilloma. She had a breast MRI and right breast ultrasound 2/1/24 that demonstrated a right breast enhancing mass, stable intraductal mass. She did meet with Dr. Holden who recommended high risk screening.     Family history of aunt with breast and family history of ovarian cancer. Amparo had negative genetic testing prior to 2014. She recently had genetic testing and there results are pending.     Today she denies any breast mass, skin change, nipple inversion or nipple discharge. She is due for a screening mammogram today.     BREAST-SPECIFIC HISTORY:    Previous breast imaging: Yes  - 11/21/12 Smammo BI-RADS 1  - 12/31/13 Smammo BI-RADS 1  - 6/3/15 Smammo BI-RADS 1  - 5/24/16 b/l Dmammo and left breast ultrasound for lump: benign cysts 7:00, 2:00 and 10:30 BI-RADS 2  - 6/5/17 Smammo BI-RADS 1  - 12/14/18 Smammo BI-RADS 1  - 7/27/20 Smammo left architectural distortion  - 7/31/20 left Dmammo and left breast ultrasound: BI-RADS 2  - 10/22/21  - 6/7/23 b/l Dmammo b/lbreast 9:00 6 mm lobulated density on mammo, right breast ultrasound 9:00 4 cm FN 1.5 cm nodule, left breast ultrasound 9:00 7 cm FN cluster of small cysts and 2:00 2.8 cm simples cyst. BI-RADS 4 biopsy recommend of right breast nodule.   - 6/22/23 right breast ultrasound guided biopsy of right breast nodule 9:00: papilloma   - 2/1/24 breast MRI and right breast ultrasound; MRI  demonstrated 9:00 right breast 1.2 cm enhancing mass, ultrasound demonstrated 9:00 4 cm FN 1.4 cm intraductal mass, stable BI-RADS 2  - 24 Smammo BI-RADS 1    Prior breast biopsies/surgeries: Yes  - 23 right breast ultrasound guided biopsy of right breast nodule 9:00: papilloma     Prior history of breast cancer or DCIS: No  Prior radiation history: No   Self breast exams: No  Breast density: extremely     GYN HISTORY:  Age at 1st pregnancy: 34. Breastfeeding history: No.   Age at menarche: 14  Menopausal: premenopausal  Menopausal hormone replacement therapy: No     RISK ASSESSMENT: < 3% 5 year risk by Tiff, > 20% lifetime risk by NO  Tiff: 1.4% 5 year risk   NO/Claudia: 24.5% lifetime risk    FAMILY HISTORY:  Breast ca: Yes  - maternal aunt past of breast cancer in 70's (2 total aunts)  Ovarian ca: Yes  - mother's aunts ovarian cancer  - paternal mother sisters  Pancreatic ca: No  Prostate: No  Gastric ca: No  Melanoma: No  Colon ca: No  Other cancer: yes  - mother with lung cancer  Other genetic, testing, syndromes, or clotting disorders: no     PAST MEDICAL HISTORY  Past Medical History:   Diagnosis Date     Gestational diabetes mellitus      History of colonic polyps      PAST SURGICAL HISTORY   Past Surgical History:   Procedure Laterality Date     BACK SURGERY      L5 S1 herniated disc, had discectomy      SECTION      x1     CHOLECYSTECTOMY       COLONOSCOPY       COLONOSCOPY N/A 2024    Procedure: Colonoscopy;  Surgeon: Dyana Castro MD;  Location:  GI     US BREAST CLIP PLACEMENT RIGHT       WISDOM TOOTH EXTRACTION       MEDICATIONS  Current Outpatient Medications   Medication Sig Dispense Refill     acetaminophen (TYLENOL) 325 MG tablet Take 325-650 mg by mouth every 6 hours as needed for mild pain       ibuprofen (ADVIL/MOTRIN) 200 MG tablet Take 200 mg by mouth every 4 hours as needed for pain       ALLERGIES   No Known Allergies     SOCIAL  HISTORY:  Smokes: No  EtOH: Yes, minimally   Exercise: active   Works for [employer]    ROS:  Change in vision No  Headaches: no  Respiratory: No shortness of breath, dyspnea on exertion, cough, or hemoptysis   Cardiovascular: negative   Gastrointestinal: negative Abdominal pain: no  Breast: negative  Musculoskeletal: negative Joint pain No Back pain: no  Psychiatric: negative  Hematologic/Lymphatic/Immunologic: negative  Endocrine: negative    EXAM  /82 (BP Location: Right arm, Patient Position: Right side, Cuff Size: Adult Regular)   Pulse 62   Temp 97.7  F (36.5  C) (Oral)   Resp 12   Wt 66.2 kg (146 lb)   SpO2 100%   BMI 22.20 kg/m     PHYSICAL EXAM  Respiratory: breathing non labored.   Breasts: Examination was done in both the upright and supine positions.  Breasts are symmetrical . No dominant fixed or suspicious masses noted. No skin or nipple changes. No nipple discharge.   No clavicular, cervical, or axillary lymphadenopathy.     INVESTIGATIONS:  8/7/24 screening mammogram: BI-RADS 1    ASSESSMENT/PLAN:    Sobeida Diaz is a 53 year old woman personal history of right breast papilloma and family history of breast cancer. She meets NCCN guidelines for high risk screening.     1) Family history of breast cancer  She meets NCCN guidelines for high risk screening based on family history with lifetime risk for breast cancer of >20%. Screening recommendations based on NCCN guidelines. Be familiar with your breast and promptly report any changes to your health care provider. Clinical encounter every 6-12 months. Annual mammogram with carol alternating with annual breast MRI.    - Breast MRI with clinic visit due: 2/2/25  - Screening mammogram with clinic visit due: 8/2024     2) Counseling was provided with available strategies including lifestyle modifications and risk reducing intervention.   - Maintain your best healthy weight. Higher body fat and adult weight gain is associated with  increased risk for breast cancer. This increase in risk has been attributed to increase in circulating endogenous estrogen levels from fat tissue.   - Any alcohol intake increases the risk for breast cancer. If you choose to drink alcohol limit alcohol consumption to less than 1 drink (1 ounce of liquor, 6 ounces of wine, or 8 ounces of beer) per day or less than 3 drinks per week.  - Be active daily and void being sedentary.   - Vitamin D may decrease the risk of developing breast cancer.     Marissa Gordillo PA-C    20 minutes spent on the date of the encounter doing chart review, review of test results, interpretation of tests, patient visit and documentation.        Again, thank you for allowing me to participate in the care of your patient.        Sincerely,        Marissa Gordillo PA-C

## 2024-08-07 NOTE — NURSING NOTE
"Oncology Rooming Note    August 7, 2024 12:05 PM   Sobeida Diaz is a 53 year old female who presents for:    Chief Complaint   Patient presents with    Oncology Clinic Visit     RTN Family hx breast Ca     Initial Vitals: /82 (BP Location: Right arm, Patient Position: Right side, Cuff Size: Adult Regular)   Pulse 62   Temp 97.7  F (36.5  C) (Oral)   Resp 12   Wt 66.2 kg (146 lb)   SpO2 100%   BMI 22.20 kg/m   Estimated body mass index is 22.2 kg/m  as calculated from the following:    Height as of 5/13/24: 1.727 m (5' 8\").    Weight as of this encounter: 66.2 kg (146 lb). Body surface area is 1.78 meters squared.  No Pain (0) Comment: Data Unavailable   No LMP recorded. Patient is perimenopausal.  Allergies reviewed: Yes  Medications reviewed: Yes    Medications: Medication refills not needed today.  Pharmacy name entered into [facility]: [facility] #[id number] - [address] AT [facility] & [address]    Frailty Screening:   Is the patient here for a new oncology consult visit in cancer care? 2. No      Clinical concerns: none      Jostin Chery             "

## 2024-08-07 NOTE — PATIENT INSTRUCTIONS
Sobeida Diaz is a 53 year old woman personal history of right breast papilloma and family history of breast cancer. She meets NCCN guidelines for high risk screening.     1) Family history of breast cancer  She meets NCCN guidelines for high risk screening based on family history with lifetime risk for breast cancer of >20%. Screening recommendations based on NCCN guidelines. Be familiar with your breast and promptly report any changes to your health care provider. Clinical encounter every 6-12 months. Annual mammogram with carol alternating with annual breast MRI.    - Breast MRI with clinic visit due: 2/2/25  - Screening mammogram with clinic visit due: 8/2024     2) Counseling was provided with available strategies including lifestyle modifications and risk reducing intervention.   - Maintain your best healthy weight. Higher body fat and adult weight gain is associated with increased risk for breast cancer. This increase in risk has been attributed to increase in circulating endogenous estrogen levels from fat tissue.   - Any alcohol intake increases the risk for breast cancer. If you choose to drink alcohol limit alcohol consumption to less than 1 drink (1 ounce of liquor, 6 ounces of wine, or 8 ounces of beer) per day or less than 3 drinks per week.  - Be active daily and void being sedentary.   - Vitamin D may decrease the risk of developing breast cancer.

## 2024-08-08 DIAGNOSIS — Z80.3 FAMILY HISTORY OF MALIGNANT NEOPLASM OF BREAST: Primary | ICD-10-CM

## 2024-08-08 DIAGNOSIS — Z80.41 FAMILY HISTORY OF MALIGNANT NEOPLASM OF OVARY: ICD-10-CM

## 2024-08-08 LAB — SCANNED LAB RESULT: NORMAL

## 2024-08-16 ENCOUNTER — VIRTUAL VISIT (OUTPATIENT)
Dept: ONCOLOGY | Facility: CLINIC | Age: 54
End: 2024-08-16
Attending: GENETIC COUNSELOR, MS
Payer: COMMERCIAL

## 2024-08-16 DIAGNOSIS — Z80.3 FAMILY HISTORY OF MALIGNANT NEOPLASM OF BREAST: Primary | ICD-10-CM

## 2024-08-16 DIAGNOSIS — Z80.41 FAMILY HISTORY OF MALIGNANT NEOPLASM OF OVARY: ICD-10-CM

## 2024-08-16 PROCEDURE — 999N000069 HC STATISTIC GENETIC COUNSELING, < 16 MIN: Mod: GT,95 | Performed by: GENETIC COUNSELOR, MS

## 2024-08-16 NOTE — Clinical Note
"8/16/2024      Sobeida Diaz  [address] [facility] [address]      Dear Colleague,    Thank you for referring your patient, Sobeida Diaz, to the [facility]. Please see a copy of my visit note below.    Virtual Visit Details    Type of service:  Video Visit     Originating Location (pt. Location): {video visit patient location:[address]112[address]::\"Home\"}  {PROVIDER LOCATION On-site should be selected for visits conducted from your clinic location or adjoining [facility] hospital, academic office, or other nearby [facility] building. Off-site should be selected for all other provider locations, including home:[id number]}  Distant Location (provider location):  {virtual location provider:766593}  Platform used for Video Visit: {Virtual Visit Platforms:259019::\"[employer]\"}      Again, thank you for allowing me to participate in the care of your patient.        Sincerely,        Gianni Kelsey GC  "

## 2024-08-16 NOTE — NURSING NOTE
Current patient location: [address] [place] [id number]    Is the patient currently in the state of [place]? YES    Visit mode:VIDEO    If the visit is dropped, the patient can be reconnected by: VIDEO VISIT: Text to cell phone:   Telephone Information:   Mobile [phone]       Will anyone else be joining the visit? NO  (If patient encounters technical issues they should call [phone]    How would you like to obtain your AVS? MyChart    Are changes needed to the allergy or medication list? N/A    Are refills needed on medications prescribed by this physician? NO    Rooming Documentation:  Questionnaire(s) not done per department protocol      Reason for visit: RECHECK    Jany SOF

## 2024-08-16 NOTE — PROGRESS NOTES
"8/16/2024    Referring Provider: Marissa Gordillo PA-C    Presenting Information:  I spoke to Sobeida by video today to discuss her genetic testing results. Her blood was drawn on 8/2/24. The Common Hereditary Cancers panel was ordered from [employer]. This testing was done because of Sobeida's family history of breast and ovarian cancer.    Genetic Testing Result: NEGATIVE  Sobeida is negative for any pathogenic (harmful) mutations in APC, SRINIVASA, AXIN2, BAP1, BARD1, BMPR1A, BRCA1, BRCA2, BRIP1, CDH1, CDK4, CDKN2A, CHEK2, CTNNA1, DICER1, EPCAM, FH, GREM1, HOXB13, KIT, MBD4, MEN1, MLH1, MSH2, MSH3, MSH6, MUTYH, NF1, NTHL1, PALB2, PDGFRA, PMS2, POLD1, POLE, PTEN, RAD51C, RAD51D, SDHA, SDHB, SDHC, SDHD, SMAD4, SMARCA4, STK11, TP53, TSC1, TSC2, and VHL. No pathogenic mutations were found in any of the 48 genes analyzed. This test involved sequencing and deletion/duplication analysis of all genes with the exceptions of EPCAM and GREM1 (deletions/duplications only) and SDHA (sequencing only).     A copy of the test report can be found in the Laboratory tab, dated 8/2/24, and named \"LABORATORY MISCELLANEOUS ORDER\". The report is scanned in as a linked document.    Interpretation:  We discussed several different interpretations of this negative test result.    One explanation may be that there is a different gene or combination of genes and environment that are associated with the cancers in this family.  It is possible that her maternal aunt with breast cancer, or extended paternal relatives with ovarian cancer, did have a mutation in one of these genes and she did not inherit it.  There is also a small possibility that there is a mutation in one of these genes, and the testing laboratory could not find it with their current testing methods.       Screening:  Based on this negative test result, it is important for Sobeida and her relatives to refer back to the family history for appropriate cancer screening.    Based on her " personal and family history, Sobeida has a 17.7% lifetime risk of developing breast cancer based on the NO model. Therefore, Sobeida does not meet current National Comprehensive Cancer Network (NCCN) guidelines for high risk breast screening, which is offered to women with a 20% lifetime risk or higher. However, it is still important for Sobeida to continue with routine breast screening under the care of her physicians. Breast cancer screening is generally recommended to begin approximately 10 years younger than the earliest age of breast cancer diagnosis in the family, or at age 40, whichever comes first. In this family, screening may begin at age 40. We discussed that this model does not account for any other clinic features that Sobeida may have, so she is encouraged to discuss breast screening with her physicians.   Other population cancer screening options, such as those recommended by the American Cancer Society and the National Comprehensive Cancer Network (NCCN), are also appropriate for Sobeida and her family. These screening recommendations may change if there are changes to Sobeida's personal and/or family history of cancer. Final screening recommendations should be made in consultation with each individual's primary care provider.      Inheritance:  We reviewed autosomal dominant inheritance. We discussed that Sobeida did not pass on an identifiable mutation in these genes to her children based on this test result. Mutations in these genes do not skip generations.      Additional Testing Considerations:  Although Sobeida's genetic testing result was negative, other relatives may still carry a gene mutation associated with breast or ovarian cancer. Genetic counseling is recommended for her father to discuss genetic testing options. If he, or any other relatives do pursue genetic testing, Sobeida is encouraged to contact me so that we may review the impact of their test results on her.    Summary:  We do  not have an explanation for Sobeida's family history of cancer. While no genetic changes were identified, Sobeida may still be at risk for certain cancers due to family history, environmental factors, or other genetic causes not identified by this test. Because of that, it is important that she continue with cancer screening based on her personal and family history as discussed above.    Genetic testing is rapidly advancing, and new cancer susceptibility genes will most likely be identified in the future. Therefore, I encouraged Sobeida to contact me regularly or if there are changes in her personal or family history. This may change how we assess her cancer risk, screening, and the testing we would offer.    Plan:  1.  A copy of the test results will be sent to Sobeida.  2. She plans to follow-up with her other providers.  3. She should contact me regularly, or sooner if her family history changes.    If Sobeida has any further questions, I encouraged her to contact me via [employer].    Time spent on video: 6 minutes.    Gianni Kelsey MS, [facility]  Licensed, Certified Genetic Counselor      Virtual Visit Details    Type of service:  Video Visit     Originating Location (pt. Location): Home  Distant Location (provider location):  Off-site  Platform used for Video Visit: Brianda

## 2024-08-16 NOTE — LETTER
"August 16, 2024    Sobeida Diaz  [address] [facility] [address]      Dear Sobeida,    It was a pleasure speaking with you over video on 8/16/2024. Here is a copy of the progress note from our discussion. If you have any additional questions, please feel free to call.    Referring Provider: Marissa Gordillo PA-C    Presenting Information:  I spoke to Sobeida by video today to discuss her genetic testing results. Her blood was drawn on 8/2/24. The Common Hereditary Cancers panel was ordered from [employer]. This testing was done because of Sobeida's family history of breast and ovarian cancer.    Genetic Testing Result: NEGATIVE  Sobeida is negative for any pathogenic (harmful) mutations in APC, SRINIVASA, AXIN2, BAP1, BARD1, BMPR1A, BRCA1, BRCA2, BRIP1, CDH1, CDK4, CDKN2A, CHEK2, CTNNA1, DICER1, EPCAM, FH, GREM1, HOXB13, KIT, MBD4, MEN1, MLH1, MSH2, MSH3, MSH6, MUTYH, NF1, NTHL1, PALB2, PDGFRA, PMS2, POLD1, POLE, PTEN, RAD51C, RAD51D, SDHA, SDHB, SDHC, SDHD, SMAD4, SMARCA4, STK11, TP53, TSC1, TSC2, and VHL. No pathogenic mutations were found in any of the 48 genes analyzed. This test involved sequencing and deletion/duplication analysis of all genes with the exceptions of EPCAM and GREM1 (deletions/duplications only) and SDHA (sequencing only).     A copy of the test report can be found in the Laboratory tab, dated 8/2/24, and named \"LABORATORY MISCELLANEOUS ORDER\". The report is scanned in as a linked document.    Interpretation:  We discussed several different interpretations of this negative test result.    One explanation may be that there is a different gene or combination of genes and environment that are associated with the cancers in this family.  It is possible that her maternal aunt with breast cancer, or extended paternal relatives with ovarian cancer, did have a mutation in one of these genes and she did not inherit it.  There is also a small possibility that there is a mutation in one of " these genes, and the testing laboratory could not find it with their current testing methods.         Screening:  Based on this negative test result, it is important for Sobeida and her relatives to refer back to the family history for appropriate cancer screening.    Based on her personal and family history, Sobeida has a 17.7% lifetime risk of developing breast cancer based on the NO model. Therefore, Sobeida does not meet current National Comprehensive Cancer Network (NCCN) guidelines for high risk breast screening, which is offered to women with a 20% lifetime risk or higher. However, it is still important for Sobeida to continue with routine breast screening under the care of her physicians. Breast cancer screening is generally recommended to begin approximately 10 years younger than the earliest age of breast cancer diagnosis in the family, or at age 40, whichever comes first. In this family, screening may begin at age 40. We discussed that this model does not account for any other clinic features that Sobeida may have, so she is encouraged to discuss breast screening with her physicians.   Other population cancer screening options, such as those recommended by the American Cancer Society and the National Comprehensive Cancer Network (NCCN), are also appropriate for Sobeida and her family. These screening recommendations may change if there are changes to Sobeida's personal and/or family history of cancer. Final screening recommendations should be made in consultation with each individual's primary care provider.      Inheritance:  We reviewed autosomal dominant inheritance. We discussed that Sobeida did not pass on an identifiable mutation in these genes to her children based on this test result. Mutations in these genes do not skip generations.      Additional Testing Considerations:  Although Sobeida's genetic testing result was negative, other relatives may still carry a gene mutation associated with  breast or ovarian cancer. Genetic counseling is recommended for her father to discuss genetic testing options. If he, or any other relatives do pursue genetic testing, Sobeida is encouraged to contact me so that we may review the impact of their test results on her.    Summary:  We do not have an explanation for Sobeida's family history of cancer. While no genetic changes were identified, Sobeida may still be at risk for certain cancers due to family history, environmental factors, or other genetic causes not identified by this test. Because of that, it is important that she continue with cancer screening based on her personal and family history as discussed above.    Genetic testing is rapidly advancing, and new cancer susceptibility genes will most likely be identified in the future. Therefore, I encouraged Sobeida to contact me regularly or if there are changes in her personal or family history. This may change how we assess her cancer risk, screening, and the testing we would offer.    Plan:  1.  A copy of the test results will be sent to Sobeida.  2. She plans to follow-up with her other providers.  3. She should contact me regularly, or sooner if her family history changes.    If Sobeida has any further questions, I encouraged her to contact me via [employer].    Time spent on video: 6 minutes.    Gianni Kelsey MS, [facility]  Licensed, Certified Genetic Counselor

## 2024-09-23 ENCOUNTER — OFFICE VISIT (OUTPATIENT)
Dept: INTERNAL MEDICINE | Facility: CLINIC | Age: 54
End: 2024-09-23
Payer: COMMERCIAL

## 2024-09-23 VITALS
OXYGEN SATURATION: 99 % | RESPIRATION RATE: 16 BRPM | BODY MASS INDEX: 21.18 KG/M2 | HEIGHT: 69 IN | HEART RATE: 59 BPM | WEIGHT: 143 LBS | DIASTOLIC BLOOD PRESSURE: 81 MMHG | SYSTOLIC BLOOD PRESSURE: 134 MMHG | TEMPERATURE: 99.9 F

## 2024-09-23 DIAGNOSIS — B07.0 PLANTAR WART OF RIGHT FOOT: ICD-10-CM

## 2024-09-23 DIAGNOSIS — Z12.83 ENCOUNTER FOR SCREENING FOR MALIGNANT NEOPLASM OF SKIN: ICD-10-CM

## 2024-09-23 DIAGNOSIS — B35.1 ONYCHOMYCOSIS: Primary | ICD-10-CM

## 2024-09-23 PROCEDURE — 99213 OFFICE O/P EST LOW 20 MIN: CPT

## 2024-09-23 NOTE — PROGRESS NOTES
"  Assessment & Plan     (B35.1) Onychomycosis  (primary encounter diagnosis)  Comment: onychomycosis of bilateral great toes. We discussed treatment options and that this can be very hard to treat. She would like to see podiatry as she is concerned about her toenails splitting  Plan: Orthopedic  Referral            (B07.0) Plantar wart of right foot  Comment:   Plan: Orthopedic  Referral            (Z12.83) Encounter for screening for malignant neoplasm of skin  Comment:   Plan: Adult Dermatology  Referral              Aroldo Christensen is a 53 year old, presenting for the following health issues:  several  concerns         9/23/2024     9:50 AM   Additional Questions   Roomed by norma     History of Present Illness       Reason for visit:  Concern about spot on face, toenail issue and plantars wart on foot.  Symptom onset:  More than a month  Symptoms include:  Minor  Symptom intensity:  Mild  Symptom progression:  Staying the same  Had these symptoms before:  Yes  Has tried/received treatment for these symptoms:  No   She is taking medications regularly.             Objective    /81   Pulse 59   Temp 99.9  F (37.7  C) (Tympanic)   Resp 16   Ht 1.74 m (5' 8.5\")   Wt 64.9 kg (143 lb)   LMP 09/13/2024   SpO2 99%   BMI 21.43 kg/m    Body mass index is 21.43 kg/m .  Physical Exam  Constitutional:       General: She is not in acute distress.     Appearance: Normal appearance. She is not ill-appearing, toxic-appearing or diaphoretic.   HENT:      Head: Normocephalic and atraumatic.   Eyes:      Conjunctiva/sclera: Conjunctivae normal.   Pulmonary:      Effort: Pulmonary effort is normal.   Feet:      Right foot:      Toenail Condition: Fungal disease present.     Left foot:      Toenail Condition: Fungal disease present.  Skin:     General: Skin is warm and dry.   Neurological:      Mental Status: She is alert and oriented to person, place, and time.   Psychiatric:         Mood " and Affect: Mood normal.         Behavior: Behavior normal.         Thought Content: Thought content normal.         Judgment: Judgment normal.                      Signed Electronically by: KEISHA Moe CNP

## 2024-09-24 ENCOUNTER — PATIENT OUTREACH (OUTPATIENT)
Dept: CARE COORDINATION | Facility: CLINIC | Age: 54
End: 2024-09-24
Payer: COMMERCIAL

## 2024-09-26 ENCOUNTER — PATIENT OUTREACH (OUTPATIENT)
Dept: CARE COORDINATION | Facility: CLINIC | Age: 54
End: 2024-09-26
Payer: COMMERCIAL

## 2024-10-14 ENCOUNTER — TRANSFERRED RECORDS (OUTPATIENT)
Dept: HEALTH INFORMATION MANAGEMENT | Facility: CLINIC | Age: 54
End: 2024-10-14
Payer: COMMERCIAL

## 2024-11-05 ENCOUNTER — OFFICE VISIT (OUTPATIENT)
Dept: PODIATRY | Facility: CLINIC | Age: 54
End: 2024-11-05
Payer: COMMERCIAL

## 2024-11-05 DIAGNOSIS — Q82.8 POROKERATOSIS: Primary | ICD-10-CM

## 2024-11-05 DIAGNOSIS — B35.1 ONYCHOMYCOSIS: ICD-10-CM

## 2024-11-05 PROCEDURE — 99203 OFFICE O/P NEW LOW 30 MIN: CPT | Performed by: PODIATRIST

## 2024-11-05 RX ORDER — ECONAZOLE NITRATE 10 MG/G
CREAM TOPICAL DAILY
Qty: 85 G | Refills: 5 | Status: SHIPPED | OUTPATIENT
Start: 2024-11-05

## 2024-11-05 NOTE — LETTER
2024      Sobeida Diaz  [address] [facility] [address]      Dear Colleague,    Thank you for referring your patient, Sobeida Diaz, to the [facility]. Please see a copy of my visit note below.    Past Medical History:   Diagnosis Date    Gestational diabetes mellitus     History of colonic polyps      Patient Active Problem List   Diagnosis    History of gestational diabetes     Past Surgical History:   Procedure Laterality Date    BACK SURGERY      L5 S1 herniated disc, had discectomy     SECTION      x1    CHOLECYSTECTOMY      COLONOSCOPY      COLONOSCOPY N/A 2024    Procedure: Colonoscopy;  Surgeon: Dyana Castro MD;  Location: [facility] BREAST CLIP PLACEMENT RIGHT      WISDOM TOOTH EXTRACTION       Social History     Socioeconomic History    Marital status:      Spouse name: Not on file    Number of children: Not on file    Years of education: Not on file    Highest education level: Not on file   Occupational History    Not on file   Tobacco Use    Smoking status: Never    Smokeless tobacco: Never   Vaping Use    Vaping status: Never Used   Substance and Sexual Activity    Alcohol use: Yes     Comment: 1 beer / day    Drug use: Never    Sexual activity: Yes   Other Topics Concern    Not on file   Social History Narrative    Not on file     Social Drivers of Health     Financial Resource Strain: Low Risk  (2024)    Financial Resource Strain     Within the past 12 months, have you or your family members you live with been unable to get utilities (heat, electricity) when it was really needed?: No   Food Insecurity: Low Risk  (2024)    Food Insecurity     Within the past 12 months, did you worry that your food would run out before you got money to buy more?: No     Within the past 12 months, did the food you bought just not last and you didn t have money to get more?: No   Transportation Needs: Low Risk   (1/5/2024)    Transportation Needs     Within the past 12 months, has lack of transportation kept you from medical appointments, getting your medicines, non-medical meetings or appointments, work, or from getting things that you need?: No   Physical Activity: Not on file   Stress: Not on file   Social Connections: Not on file   Interpersonal Safety: Low Risk  (9/23/2024)    Interpersonal Safety     Do you feel physically and emotionally safe where you currently live?: Yes     Within the past 12 months, have you been hit, slapped, kicked or otherwise physically hurt by someone?: No     Within the past 12 months, have you been humiliated or emotionally abused in other ways by your partner or ex-partner?: No   Housing Stability: Low Risk  (1/5/2024)    Housing Stability     Do you have housing? : Yes     Are you worried about losing your housing?: No     No family history on file.                          Subjective findings- 54-year-old presents for toenail fungus.  Relates she had gel nail polish on for a long time and took it off last summer and her nail was thick and discolored, relates she seen her primary physician who thought it was onychomycosis, relates they do not hurt, relates no injuries, relates no specific treatment.  Relates she has a lesion on the plantar right foot that they are not sure if it is a plantar wart or not, relates no injuries, relates no specific relieving or aggravating factors.    Objective findings- DP and PT are 2 out of 4 bilaterally.  Has dystrophic thickened nails with subungual debris dystrophy and discoloration hallux nails bilaterally.  Has mild discoloration of lesser nails bilaterally.  Has nucleated hyperkeratotic tissue buildup plantar right MPJs.  Has mild dorsal contracted digits bilaterally.  Has minimal laterally deviated hallux bilaterally.  There is no erythema, no edema, no drainage, no odor, no calor, no pain on palpation bilaterally.    Assessment and plan-  Onychomycosis.  Porokeratosis right plantar MPJs.  Diagnosis and treatment options discussed with the patient.  Prescription for Eicon is all cream given and use discussed with the patient.  Patient is advised on vinegar water soaks.  Patient is advised on pumice stone use and Urea cream for the porokeratosis.  Previous notes reviewed.  Return to clinic and see me in 3 to 4 months.            Low to moderate level of medical decision making.      Again, thank you for allowing me to participate in the care of your patient.        Sincerely,        Solo Colvin DPM

## 2024-11-05 NOTE — PROGRESS NOTES
Past Medical History:   Diagnosis Date    Gestational diabetes mellitus     History of colonic polyps      Patient Active Problem List   Diagnosis    History of gestational diabetes     Past Surgical History:   Procedure Laterality Date    BACK SURGERY      L5 S1 herniated disc, had discectomy     SECTION      x1    CHOLECYSTECTOMY      COLONOSCOPY      COLONOSCOPY N/A 2024    Procedure: Colonoscopy;  Surgeon: Dyana Castro MD;  Location:  GI    US BREAST CLIP PLACEMENT RIGHT      WISDOM TOOTH EXTRACTION       Social History     Socioeconomic History    Marital status:      Spouse name: Not on file    Number of children: Not on file    Years of education: Not on file    Highest education level: Not on file   Occupational History    Not on file   Tobacco Use    Smoking status: Never    Smokeless tobacco: Never   Vaping Use    Vaping status: Never Used   Substance and Sexual Activity    Alcohol use: Yes     Comment: 1 beer / day    Drug use: Never    Sexual activity: Yes   Other Topics Concern    Not on file   Social History Narrative    Not on file     Social Drivers of Health     Financial Resource Strain: Low Risk  (2024)    Financial Resource Strain     Within the past 12 months, have you or your family members you live with been unable to get utilities (heat, electricity) when it was really needed?: No   Food Insecurity: Low Risk  (2024)    Food Insecurity     Within the past 12 months, did you worry that your food would run out before you got money to buy more?: No     Within the past 12 months, did the food you bought just not last and you didn t have money to get more?: No   Transportation Needs: Low Risk  (2024)    Transportation Needs     Within the past 12 months, has lack of transportation kept you from medical appointments, getting your medicines, non-medical meetings or appointments, work, or from getting things that you need?: No   Physical Activity: Not  on file   Stress: Not on file   Social Connections: Not on file   Interpersonal Safety: Low Risk  (9/23/2024)    Interpersonal Safety     Do you feel physically and emotionally safe where you currently live?: Yes     Within the past 12 months, have you been hit, slapped, kicked or otherwise physically hurt by someone?: No     Within the past 12 months, have you been humiliated or emotionally abused in other ways by your partner or ex-partner?: No   Housing Stability: Low Risk  (1/5/2024)    Housing Stability     Do you have housing? : Yes     Are you worried about losing your housing?: No     No family history on file.                          Subjective findings- 54-year-old presents for toenail fungus.  Relates she had gel nail polish on for a long time and took it off last summer and her nail was thick and discolored, relates she seen her primary physician who thought it was onychomycosis, relates they do not hurt, relates no injuries, relates no specific treatment.  Relates she has a lesion on the plantar right foot that they are not sure if it is a plantar wart or not, relates no injuries, relates no specific relieving or aggravating factors.    Objective findings- DP and PT are 2 out of 4 bilaterally.  Has dystrophic thickened nails with subungual debris dystrophy and discoloration hallux nails bilaterally.  Has mild discoloration of lesser nails bilaterally.  Has nucleated hyperkeratotic tissue buildup plantar right MPJs.  Has mild dorsal contracted digits bilaterally.  Has minimal laterally deviated hallux bilaterally.  There is no erythema, no edema, no drainage, no odor, no calor, no pain on palpation bilaterally.    Assessment and plan- Onychomycosis.  Porokeratosis right plantar MPJs.  Diagnosis and treatment options discussed with the patient.  Prescription for Eicon is all cream given and use discussed with the patient.  Patient is advised on vinegar water soaks.  Patient is advised on pumice stone use  and Urea cream for the porokeratosis.  Previous notes reviewed.  Return to clinic and see me in 3 to 4 months.                                                                                                      Low to moderate level of medical decision making.

## 2024-11-05 NOTE — NURSING NOTE
Sobeida Daiz's chief complaint for this visit includes:  Chief Complaint   Patient presents with    Consult     Toe nail fungus on the the big toes, bilateral. Possible plantars wart     PCP: Cynthia - [facility]    Referring Provider:  KEISHA Kaur CNP  [address] [place] [address]    [facility] 09/13/2024   Data Unavailable     Do you need any medication refills at today's visit? NO    No Known Allergies    Awilda Armstrong LPN

## 2024-11-07 ENCOUNTER — MYC MEDICAL ADVICE (OUTPATIENT)
Dept: PODIATRY | Facility: CLINIC | Age: 54
End: 2024-11-07
Payer: COMMERCIAL

## 2025-01-24 PROBLEM — D24.1 PAPILLOMA OF RIGHT BREAST: Status: ACTIVE | Noted: 2025-01-24

## 2025-01-24 PROBLEM — Z80.3 FAMILY HISTORY OF MALIGNANT NEOPLASM OF BREAST: Status: ACTIVE | Noted: 2025-01-24

## 2025-02-07 ENCOUNTER — HOSPITAL ENCOUNTER (OUTPATIENT)
Dept: MRI IMAGING | Facility: CLINIC | Age: 55
Discharge: HOME | End: 2025-02-07
Attending: PHYSICIAN ASSISTANT | Admitting: PHYSICIAN ASSISTANT
Payer: COMMERCIAL

## 2025-02-07 DIAGNOSIS — Z91.89 AT HIGH RISK FOR BREAST CANCER: ICD-10-CM

## 2025-02-07 PROCEDURE — 255N000002 HC RX 255 OP 636: Performed by: PHYSICIAN ASSISTANT

## 2025-02-07 PROCEDURE — 77049 MRI BREAST C-+ W/CAD BI: CPT

## 2025-02-07 PROCEDURE — A9585 GADOBUTROL INJECTION: HCPCS | Performed by: PHYSICIAN ASSISTANT

## 2025-02-07 RX ORDER — GADOBUTROL 604.72 MG/ML
7 INJECTION INTRAVENOUS ONCE
Status: COMPLETED | OUTPATIENT
Start: 2025-02-07 | End: 2025-02-07

## 2025-02-07 RX ADMIN — GADOBUTROL 7 ML: 604.72 INJECTION INTRAVENOUS at 13:18

## 2025-02-14 ENCOUNTER — MYC MEDICAL ADVICE (OUTPATIENT)
Dept: SURGERY | Facility: CLINIC | Age: 55
End: 2025-02-14
Payer: COMMERCIAL

## 2025-02-14 DIAGNOSIS — Z12.31 ENCOUNTER FOR SCREENING MAMMOGRAM FOR BREAST CANCER: ICD-10-CM

## 2025-02-14 DIAGNOSIS — Z91.89 AT HIGH RISK FOR BREAST CANCER: Primary | ICD-10-CM

## 2025-05-08 ENCOUNTER — MYC MEDICAL ADVICE (OUTPATIENT)
Dept: FAMILY MEDICINE | Facility: CLINIC | Age: 55
End: 2025-05-08
Payer: COMMERCIAL

## 2025-05-21 ENCOUNTER — OFFICE VISIT (OUTPATIENT)
Dept: FAMILY MEDICINE | Facility: CLINIC | Age: 55
End: 2025-05-21
Payer: COMMERCIAL

## 2025-05-21 VITALS
OXYGEN SATURATION: 100 % | SYSTOLIC BLOOD PRESSURE: 131 MMHG | BODY MASS INDEX: 22.73 KG/M2 | WEIGHT: 150 LBS | TEMPERATURE: 97.8 F | RESPIRATION RATE: 16 BRPM | HEART RATE: 68 BPM | DIASTOLIC BLOOD PRESSURE: 83 MMHG | HEIGHT: 68 IN

## 2025-05-21 DIAGNOSIS — K09.8 ORAL CYST: Primary | ICD-10-CM

## 2025-05-21 PROCEDURE — 99214 OFFICE O/P EST MOD 30 MIN: CPT | Performed by: FAMILY MEDICINE

## 2025-05-21 PROCEDURE — 3079F DIAST BP 80-89 MM HG: CPT | Performed by: FAMILY MEDICINE

## 2025-05-21 PROCEDURE — 1126F AMNT PAIN NOTED NONE PRSNT: CPT | Performed by: FAMILY MEDICINE

## 2025-05-21 PROCEDURE — 3075F SYST BP GE 130 - 139MM HG: CPT | Performed by: FAMILY MEDICINE

## 2025-05-21 ASSESSMENT — PAIN SCALES - GENERAL: PAINLEVEL_OUTOF10: NO PAIN (0)

## 2025-05-21 NOTE — PROGRESS NOTES
"  Assessment & Plan     Oral cyst  She noticed a couple of months ago a lump/cyst on the right bottom inside gum area a small white appearing cyst or lump , which is smooth and non draining , non erythematous , we discussed seeing an oral specialist , I placed a referral for ENT but she can schedule an appointment with her dentist to do some oral X rays and if needed to refer her to maxillo- facial oral specialist   - Adult ENT  Referral; Future    RTC if no improving or worsening.  Pt is aware  and comfortable with the current plan.        Aroldo Christensen is a 54 year old, presenting for the following health issues:  Mouth Problem (White hard spot in mouth for a few months)      5/21/2025    10:23 AM   Additional Questions   Roomed by christian     History of Present Illness       Reason for visit:  Concern about spot inside mouth  Symptom onset:  More than a month  Symptoms include:  Bump on gum  Symptom intensity:  Mild  Symptom progression:  Staying the same  Had these symptoms before:  No   She is taking medications regularly.              Review of Systems  Constitutional, HEENT, cardiovascular, pulmonary, GI, , musculoskeletal, neuro, skin, endocrine and psych systems are negative, except as otherwise noted.      Objective    Ht 1.727 m (5' 8\")   Wt 68 kg (150 lb)   LMP 09/13/2024   BMI 22.81 kg/m    Body mass index is 22.81 kg/m .  Physical Exam   GENERAL: alert and no distress  EYES: Eyes grossly normal to inspection, PERRL and conjunctivae and sclerae normal  HENT: normal cephalic/atraumatic, ear canals and TM's normal, nose and mouth without ulcers or lesions, oropharynx clear, oral mucous membranes moist, and there is a small ( about 1 cm in size ) white surface smooth and firm to the touch oral lump in the base of the right lower inside gums , no erythema ,, no ulcers no draining   NECK: no adenopathy, no asymmetry, masses, or scars  RESP: lungs clear to auscultation - no rales, rhonchi or " wheezes  MS: no gross musculoskeletal defects noted, no edema    No results found for this or any previous visit (from the past 24 hours).        Signed Electronically by: Sintia Marks MD

## 2025-05-29 NOTE — PROGRESS NOTES
ENT Consultation    Sobeida Diaz who is a 54 year old female seen in consultation at the request of Dr. Sintia Marks.      History of Present Illness - Sobeida Diaz is a 54 year old female patient presents because she noticed the a lump under her tongue on the inner table of the mandible on the right side last few months.  It has not grown.  Is not causing discomfort does not cause any bleeding or ulceration.  But she was concerned.  Otherwise very healthy individual.            BP Readings from Last 1 Encounters:   06/04/25 122/80       BP noted to be well controlled today in office.     Sobeida IS NOT a smoker/uses chewing tobacco.      Past Medical History -   Past Medical History:   Diagnosis Date    Gestational diabetes mellitus     History of colonic polyps        Current Medications -   Current Outpatient Medications:     econazole nitrate 1 % external cream, Apply topically daily. To toenails. (Patient not taking: Reported on 6/4/2025), Disp: 85 g, Rfl: 5    Allergies - No Known Allergies    Social History -   Social History     Socioeconomic History    Marital status:    Tobacco Use    Smoking status: Never     Passive exposure: Never    Smokeless tobacco: Never   Vaping Use    Vaping status: Never Used   Substance and Sexual Activity    Alcohol use: Yes     Comment: 1 beer / day    Drug use: Never    Sexual activity: Yes     Partners: Male     Birth control/protection: Condom   Other Topics Concern    Parent/sibling w/ CABG, MI or angioplasty before 65F 55M? No     Social Drivers of Health     Financial Resource Strain: Low Risk  (1/23/2025)    Financial Resource Strain     Within the past 12 months, have you or your family members you live with been unable to get utilities (heat, electricity) when it was really needed?: No   Food Insecurity: Low Risk  (1/23/2025)    Food Insecurity     Within the past 12 months, did you worry that your food would run out before you got  money to buy more?: No     Within the past 12 months, did the food you bought just not last and you didn t have money to get more?: No   Transportation Needs: Low Risk  (1/23/2025)    Transportation Needs     Within the past 12 months, has lack of transportation kept you from medical appointments, getting your medicines, non-medical meetings or appointments, work, or from getting things that you need?: No   Physical Activity: Insufficiently Active (1/23/2025)    Exercise Vital Sign     Days of Exercise per Week: 2 days     Minutes of Exercise per Session: 60 min   Stress: No Stress Concern Present (1/23/2025)    [employer] - Occupational Stress Questionnaire     Feeling of Stress : Not at all   Social Connections: Unknown (1/23/2025)    Social Connection and Isolation Panel [NHANES]     Frequency of Social Gatherings with Friends and Family: Once a week   Interpersonal Safety: Low Risk  (1/24/2025)    Interpersonal Safety     Do you feel physically and emotionally safe where you currently live?: Yes     Within the past 12 months, have you been hit, slapped, kicked or otherwise physically hurt by someone?: No     Within the past 12 months, have you been humiliated or emotionally abused in other ways by your partner or ex-partner?: No   Housing Stability: Low Risk  (1/23/2025)    Housing Stability     Do you have housing? : Yes     Are you worried about losing your housing?: No       Family History -   Family History   Problem Relation Age of Onset    Hypertension Mother     Other Cancer Mother         Lung    Asthma Mother     Hypertension Father     Heart Disease Maternal Grandmother     Heart Failure Maternal Grandmother     Osteoarthritis Maternal Grandmother     Heart Disease Maternal Grandfather     Heart Disease Paternal Grandfather     Substance Abuse Paternal Grandfather     Other Cancer Other         Breast       Review of Systems - As per HPI and PMHx, otherwise review of  "system review of the head and neck negative. Otherwise 10+ review of system is negative    Physical Exam  /80   Temp 97.5  F (36.4  C) (Temporal)   Ht 1.727 m (5' 8\")   Wt 67.8 kg (149 lb 7.6 oz)   LMP 09/13/2024   BMI 22.73 kg/m    BMI: Body mass index is 22.73 kg/m .    General - The patient is well nourished and well developed, and appears to have good nutritional status.  Alert and oriented to person and place, answers questions and cooperates with examination appropriately.    SKIN - No suspicious lesions or rashes.  Respiration - No respiratory distress.  Head and Face - Normocephalic and atraumatic, with no gross asymmetry noted of the contour of the facial features.  The facial nerve is intact, with strong symmetric movements.    Voice and Breathing - The patient was breathing comfortably without the use of accessory muscles. The patients voice was clear and strong, and had appropriate pitch and quality.    Ears - Bilateral pinna and EACs with normal appearing overlying skin. Tympanic membrane intact with good mobility on pneumatic otoscopy bilaterally. Bony landmarks of the ossicular chain are normal. The tympanic membranes are normal in appearance. No retraction, perforation, or masses.  No fluid or purulence was seen in the external canal or the middle ear.     Eyes - Extraocular movements intact.  Sclera were not icteric or injected, conjunctiva were pink and moist.    Mouth - Examination of the oral cavity showed pink, healthy oral mucosa. No lesions or ulcerations noted.  The tongue was mobile and midline, and the dentition were in good condition.  I do see about the 3 mm raised smooth bony growth on the inner table of the mandible to the right of the midline.  Corresponding teeth in the area appear to be solid no caries no decay noted no discomfort on palpation in the area.    Throat - The walls of the oropharynx were smooth, pink, moist, symmetric, and had no lesions or ulcerations.  The " tonsillar pillars and soft palate were symmetric.  The uvula was midline on elevation.    Neck - Normal midline excursion of the laryngotracheal complex during swallowing.  Full range of motion on passive movement.  Palpation of the occipital, submental, submandibular, internal jugular chain, and supraclavicular nodes did not demonstrate any abnormal lymph nodes or masses.  The carotid pulse was palpable bilaterally.  Palpation of the thyroid was soft and smooth, with no nodules or goiter appreciated.  The trachea was mobile and midline.    Nose - External contour is symmetric, no gross deflection or scars.  Nasal mucosa is pink and moist with no abnormal mucus.  The septum was midline and non-obstructive, turbinates of normal size and position.  No polyps, masses, or purulence noted on examination.    Neuro - Nonfocal neuro exam is normal, CN 2 through 12 intact, normal gait and muscle tone.          A/P - Sobeida Diaz is a 54 year old female with bony growth of the mandible.  Certainly could be solid could be a cystic mass.  Further evaluation is needed.  Strongly suggest patient see dental specialist preferably oral surgeon with the proper imaging done at least the: CT scan and/or panoramic x-ray.      Deacon Argueta MD

## 2025-06-04 ENCOUNTER — OFFICE VISIT (OUTPATIENT)
Dept: OTOLARYNGOLOGY | Facility: OTHER | Age: 55
End: 2025-06-04
Payer: COMMERCIAL

## 2025-06-04 VITALS
HEIGHT: 68 IN | WEIGHT: 149.47 LBS | SYSTOLIC BLOOD PRESSURE: 122 MMHG | BODY MASS INDEX: 22.65 KG/M2 | DIASTOLIC BLOOD PRESSURE: 80 MMHG | TEMPERATURE: 97.5 F

## 2025-06-04 DIAGNOSIS — K09.8 ORAL CYST: ICD-10-CM

## 2025-06-04 PROCEDURE — 99203 OFFICE O/P NEW LOW 30 MIN: CPT | Performed by: OTOLARYNGOLOGY

## 2025-06-04 PROCEDURE — 3074F SYST BP LT 130 MM HG: CPT | Performed by: OTOLARYNGOLOGY

## 2025-06-04 PROCEDURE — 3079F DIAST BP 80-89 MM HG: CPT | Performed by: OTOLARYNGOLOGY

## 2025-06-04 NOTE — LETTER
6/4/2025      Sobeida Diaz  [address] [facility] [address]      Dear Colleague,    Thank you for referring your patient, Sobeida Diaz, to the [facility]. Please see a copy of my visit note below.    ENT Consultation    Sobeida Diaz who is a 54 year old female seen in consultation at the request of Dr. Sintia Marks.      History of Present Illness - Sobeida Diaz is a 54 year old female patient presents because she noticed the a lump under her tongue on the inner table of the mandible on the right side last few months.  It has not grown.  Is not causing discomfort does not cause any bleeding or ulceration.  But she was concerned.  Otherwise very healthy individual.            BP Readings from Last 1 Encounters:   06/04/25 122/80       BP noted to be well controlled today in office.     Sobeida IS NOT a smoker/uses chewing tobacco.      Past Medical History -   Past Medical History:   Diagnosis Date     Gestational diabetes mellitus      History of colonic polyps        Current Medications -   Current Outpatient Medications:      econazole nitrate 1 % external cream, Apply topically daily. To toenails. (Patient not taking: Reported on 6/4/2025), Disp: 85 g, Rfl: 5    Allergies - No Known Allergies    Social History -   Social History     Socioeconomic History     Marital status:    Tobacco Use     Smoking status: Never     Passive exposure: Never     Smokeless tobacco: Never   Vaping Use     Vaping status: Never Used   Substance and Sexual Activity     Alcohol use: Yes     Comment: 1 beer / day     Drug use: Never     Sexual activity: Yes     Partners: Male     Birth control/protection: Condom   Other Topics Concern     Parent/sibling w/ CABG, MI or angioplasty before 65F 55M? No     Social Drivers of Health     Financial Resource Strain: Low Risk  (1/23/2025)    Financial Resource Strain      Within the past 12 months,  have you or your family members you live with been unable to get utilities (heat, electricity) when it was really needed?: No   Food Insecurity: Low Risk  (1/23/2025)    Food Insecurity      Within the past 12 months, did you worry that your food would run out before you got money to buy more?: No      Within the past 12 months, did the food you bought just not last and you didn t have money to get more?: No   Transportation Needs: Low Risk  (1/23/2025)    Transportation Needs      Within the past 12 months, has lack of transportation kept you from medical appointments, getting your medicines, non-medical meetings or appointments, work, or from getting things that you need?: No   Physical Activity: Insufficiently Active (1/23/2025)    Exercise Vital Sign      Days of Exercise per Week: 2 days      Minutes of Exercise per Session: 60 min   Stress: No Stress Concern Present (1/23/2025)    [employer] - Occupational Stress Questionnaire      Feeling of Stress : Not at all   Social Connections: Unknown (1/23/2025)    Social Connection and Isolation Panel [NHANES]      Frequency of Social Gatherings with Friends and Family: Once a week   Interpersonal Safety: Low Risk  (1/24/2025)    Interpersonal Safety      Do you feel physically and emotionally safe where you currently live?: Yes      Within the past 12 months, have you been hit, slapped, kicked or otherwise physically hurt by someone?: No      Within the past 12 months, have you been humiliated or emotionally abused in other ways by your partner or ex-partner?: No   Housing Stability: Low Risk  (1/23/2025)    Housing Stability      Do you have housing? : Yes      Are you worried about losing your housing?: No       Family History -   Family History   Problem Relation Age of Onset     Hypertension Mother      Other Cancer Mother         Lung     Asthma Mother      Hypertension Father      Heart Disease Maternal Grandmother      Heart  "Failure Maternal Grandmother      Osteoarthritis Maternal Grandmother      Heart Disease Maternal Grandfather      Heart Disease Paternal Grandfather      Substance Abuse Paternal Grandfather      Other Cancer Other         Breast       Review of Systems - As per HPI and PMHx, otherwise review of system review of the head and neck negative. Otherwise 10+ review of system is negative    Physical Exam  /80   Temp 97.5  F (36.4  C) (Temporal)   Ht 1.727 m (5' 8\")   Wt 67.8 kg (149 lb 7.6 oz)   LMP 09/13/2024   BMI 22.73 kg/m    BMI: Body mass index is 22.73 kg/m .    General - The patient is well nourished and well developed, and appears to have good nutritional status.  Alert and oriented to person and place, answers questions and cooperates with examination appropriately.    SKIN - No suspicious lesions or rashes.  Respiration - No respiratory distress.  Head and Face - Normocephalic and atraumatic, with no gross asymmetry noted of the contour of the facial features.  The facial nerve is intact, with strong symmetric movements.    Voice and Breathing - The patient was breathing comfortably without the use of accessory muscles. The patients voice was clear and strong, and had appropriate pitch and quality.    Ears - Bilateral pinna and EACs with normal appearing overlying skin. Tympanic membrane intact with good mobility on pneumatic otoscopy bilaterally. Bony landmarks of the ossicular chain are normal. The tympanic membranes are normal in appearance. No retraction, perforation, or masses.  No fluid or purulence was seen in the external canal or the middle ear.     Eyes - Extraocular movements intact.  Sclera were not icteric or injected, conjunctiva were pink and moist.    Mouth - Examination of the oral cavity showed pink, healthy oral mucosa. No lesions or ulcerations noted.  The tongue was mobile and midline, and the dentition were in good condition.  I do see about the 3 mm raised smooth bony growth " on the inner table of the mandible to the right of the midline.  Corresponding teeth in the area appear to be solid no caries no decay noted no discomfort on palpation in the area.    Throat - The walls of the oropharynx were smooth, pink, moist, symmetric, and had no lesions or ulcerations.  The tonsillar pillars and soft palate were symmetric.  The uvula was midline on elevation.    Neck - Normal midline excursion of the laryngotracheal complex during swallowing.  Full range of motion on passive movement.  Palpation of the occipital, submental, submandibular, internal jugular chain, and supraclavicular nodes did not demonstrate any abnormal lymph nodes or masses.  The carotid pulse was palpable bilaterally.  Palpation of the thyroid was soft and smooth, with no nodules or goiter appreciated.  The trachea was mobile and midline.    Nose - External contour is symmetric, no gross deflection or scars.  Nasal mucosa is pink and moist with no abnormal mucus.  The septum was midline and non-obstructive, turbinates of normal size and position.  No polyps, masses, or purulence noted on examination.    Neuro - Nonfocal neuro exam is normal, CN 2 through 12 intact, normal gait and muscle tone.          A/P - Sobeidaliborio Diaz is a 54 year old female with bony growth of the mandible.  Certainly could be solid could be a cystic mass.  Further evaluation is needed.  Strongly suggest patient see dental specialist preferably oral surgeon with the proper imaging done at least the: CT scan and/or panoramic x-ray.      Deacon Argueta MD     Again, thank you for allowing me to participate in the care of your patient.        Sincerely,        Deacon Argueta MD, MD    Electronically signed

## 2025-08-13 ENCOUNTER — ANCILLARY PROCEDURE (OUTPATIENT)
Dept: MAMMOGRAPHY | Facility: CLINIC | Age: 55
End: 2025-08-13
Attending: PHYSICIAN ASSISTANT
Payer: COMMERCIAL

## 2025-08-13 ENCOUNTER — ONCOLOGY VISIT (OUTPATIENT)
Dept: SURGERY | Facility: CLINIC | Age: 55
End: 2025-08-13
Attending: PHYSICIAN ASSISTANT
Payer: COMMERCIAL

## 2025-08-13 VITALS
OXYGEN SATURATION: 99 % | SYSTOLIC BLOOD PRESSURE: 140 MMHG | RESPIRATION RATE: 16 BRPM | TEMPERATURE: 97.7 F | HEART RATE: 61 BPM | WEIGHT: 148.6 LBS | BODY MASS INDEX: 22.59 KG/M2 | DIASTOLIC BLOOD PRESSURE: 97 MMHG

## 2025-08-13 DIAGNOSIS — Z91.89 AT HIGH RISK FOR BREAST CANCER: Primary | ICD-10-CM

## 2025-08-13 DIAGNOSIS — Z12.31 ENCOUNTER FOR SCREENING MAMMOGRAM FOR BREAST CANCER: ICD-10-CM

## 2025-08-13 DIAGNOSIS — R92.8 ABNORMAL MAMMOGRAM OF LEFT BREAST: ICD-10-CM

## 2025-08-13 PROCEDURE — 77065 DX MAMMO INCL CAD UNI: CPT | Mod: LT | Performed by: STUDENT IN AN ORGANIZED HEALTH CARE EDUCATION/TRAINING PROGRAM

## 2025-08-13 PROCEDURE — 77067 SCR MAMMO BI INCL CAD: CPT | Mod: GC | Performed by: STUDENT IN AN ORGANIZED HEALTH CARE EDUCATION/TRAINING PROGRAM

## 2025-08-13 PROCEDURE — 99213 OFFICE O/P EST LOW 20 MIN: CPT | Performed by: PHYSICIAN ASSISTANT

## 2025-08-13 PROCEDURE — 77063 BREAST TOMOSYNTHESIS BI: CPT | Mod: GC | Performed by: STUDENT IN AN ORGANIZED HEALTH CARE EDUCATION/TRAINING PROGRAM

## 2025-08-13 PROCEDURE — G0279 TOMOSYNTHESIS, MAMMO: HCPCS | Performed by: STUDENT IN AN ORGANIZED HEALTH CARE EDUCATION/TRAINING PROGRAM

## 2025-08-13 RX ORDER — MULTIVIT-MIN/IRON FUM/FOLIC AC 7.5 MG-4
1 TABLET ORAL DAILY
COMMUNITY

## 2025-08-13 ASSESSMENT — PAIN SCALES - GENERAL: PAINLEVEL_OUTOF10: NO PAIN (0)

## (undated) RX ORDER — FENTANYL CITRATE 50 UG/ML
INJECTION, SOLUTION INTRAMUSCULAR; INTRAVENOUS
Status: DISPENSED
Start: 2024-05-13